# Patient Record
Sex: FEMALE | Race: WHITE | NOT HISPANIC OR LATINO
[De-identification: names, ages, dates, MRNs, and addresses within clinical notes are randomized per-mention and may not be internally consistent; named-entity substitution may affect disease eponyms.]

---

## 2018-03-06 ENCOUNTER — TRANSCRIPTION ENCOUNTER (OUTPATIENT)
Age: 58
End: 2018-03-06

## 2018-04-16 ENCOUNTER — TRANSCRIPTION ENCOUNTER (OUTPATIENT)
Age: 58
End: 2018-04-16

## 2019-03-25 ENCOUNTER — TRANSCRIPTION ENCOUNTER (OUTPATIENT)
Age: 59
End: 2019-03-25

## 2019-04-16 ENCOUNTER — TRANSCRIPTION ENCOUNTER (OUTPATIENT)
Age: 59
End: 2019-04-16

## 2019-07-08 ENCOUNTER — TRANSCRIPTION ENCOUNTER (OUTPATIENT)
Age: 59
End: 2019-07-08

## 2019-09-16 ENCOUNTER — APPOINTMENT (OUTPATIENT)
Dept: UROLOGY | Facility: CLINIC | Age: 59
End: 2019-09-16
Payer: COMMERCIAL

## 2019-09-16 VITALS
HEIGHT: 62 IN | TEMPERATURE: 98 F | HEART RATE: 89 BPM | DIASTOLIC BLOOD PRESSURE: 87 MMHG | RESPIRATION RATE: 16 BRPM | SYSTOLIC BLOOD PRESSURE: 131 MMHG | BODY MASS INDEX: 23.55 KG/M2 | WEIGHT: 128 LBS

## 2019-09-16 DIAGNOSIS — N39.46 MIXED INCONTINENCE: ICD-10-CM

## 2019-09-16 DIAGNOSIS — Z78.9 OTHER SPECIFIED HEALTH STATUS: ICD-10-CM

## 2019-09-16 DIAGNOSIS — Z80.8 FAMILY HISTORY OF MALIGNANT NEOPLASM OF OTHER ORGANS OR SYSTEMS: ICD-10-CM

## 2019-09-16 DIAGNOSIS — Z83.3 FAMILY HISTORY OF DIABETES MELLITUS: ICD-10-CM

## 2019-09-16 DIAGNOSIS — Z82.49 FAMILY HISTORY OF ISCHEMIC HEART DISEASE AND OTHER DISEASES OF THE CIRCULATORY SYSTEM: ICD-10-CM

## 2019-09-16 DIAGNOSIS — N32.81 OVERACTIVE BLADDER: ICD-10-CM

## 2019-09-16 PROCEDURE — 99204 OFFICE O/P NEW MOD 45 MIN: CPT

## 2019-09-16 NOTE — PHYSICAL EXAM
[General Appearance - Well Developed] : well developed [General Appearance - Well Nourished] : well nourished [Normal Appearance] : normal appearance [General Appearance - In No Acute Distress] : no acute distress [Well Groomed] : well groomed [Edema] : no peripheral edema [Exaggerated Use Of Accessory Muscles For Inspiration] : no accessory muscle use [Respiration, Rhythm And Depth] : normal respiratory rhythm and effort [Abdomen Soft] : soft [Abdomen Tenderness] : non-tender [Costovertebral Angle Tenderness] : no ~M costovertebral angle tenderness [Urethral Meatus] : the meatus of the urethra showed no abnormalities [External Female Genitalia] : normal external genitalia [Urinary Bladder Findings] : the bladder was normal on palpation [Vagina] : normal vaginal exam [FreeTextEntry1] : - UH, - CST, no prolapse noted.  [Normal Station and Gait] : the gait and station were normal for the patient's age [] : no rash [No Focal Deficits] : no focal deficits [Oriented To Time, Place, And Person] : oriented to person, place, and time

## 2019-09-16 NOTE — REVIEW OF SYSTEMS
[Negative] : Heme/Lymph [Eyesight Problems] : eyesight problems [Dry Eyes] : dryness of the eyes [Chest Pain] : chest pain [Palpitations] : palpitations [Abdominal Pain] : abdominal pain [Shortness Of Breath] : shortness of breath [Constipation] : constipation [Vomiting] : vomiting [Diarrhea] : diarrhea [Heartburn] : heartburn [Painful Echelon] : painful Echelon [Loss of interest] : loss of interest in sexual activity [Genital bacterial infection] : genital bacterial infection [Genital yeast infection] : genital yeast infection [Presently in menopause ___] : presently in menopause [unfilled] [Seen by urologist before (Name)  ___] : Previously seen by a urologist: [unfilled] [Urine Infection (bladder/kidney)] : bladder/kidney infection [Pain during urination] : pain during urination [Wake up at night to urinate  How many times?  ___] : wakes up to urinate [unfilled] times during the night [Strong urge to urinate] : strong urge to urinate [Strain or push to urinate] : strain or push to urinate [Wait a long time to urinate] : waits a long time to urinate [Slow urine stream] : slow urine stream [Interrupted urine stream] : interrupted urine stream [Bladder fullness after urinating] : bladder fullness after urinating [Leakage of urine with urgency] : leakage of urine with urgency [Leakage of urine with straining, coughing, laughing] : leakage of urine with straining, coughing, laughing [Joint Swelling] : joint swelling [Joint Pain] : joint pain [Limb Swelling] : limb swelling [Joint Stiffness] : joint stiffness [Skin Lesions] : skin lesion [Skin Wound] : skin wound [Dizziness] : dizziness [Itching] : itching [Limb Weakness] : limb weakness [Anxiety] : anxiety [Depression] : depression [Hot Flashes] : hot flashes [Muscle Weakness] : muscle weakness [Feelings Of Weakness] : feelings of weakness [FreeTextEntry3] : frequent urination

## 2019-09-16 NOTE — HISTORY OF PRESENT ILLNESS
[FreeTextEntry1] : 59F  presenting with new onset urgency. Patient had a midurethral sling in  for stress urinary incontinence which she says has been effective in managing her symptoms. Patient states over the past few months she has been experiencing urgency, and that when she feels the urge to void she has to run to the restroom. She has been wearing a pantiliner during the day as sometimes she leaks on the way to the restroom. One night, she was unable to make it and voided on herself. Upon completion of voiding, she often finds herself voiding more as she stands to leave the toilet although she does not have the sensation that she must continue urinating. She also state she has fecal urgency, and must run to the restroom to defecate when feeling the urge, and sometimes a small amount of stool leaks out. She denies irritative voiding symptoms. . Occ DEEPALI. \par \par Patient with a history of migraines receives regular Botox injections.

## 2019-09-16 NOTE — ASSESSMENT
[FreeTextEntry1] : \par \par Impression/plan: 60 yo woman with PSH MUS with OAB, ORQUIDEA and incomplete emptying. \par \par 1. VUDS.\par 2. Cysto. \par 3. VD (SP). \par

## 2019-12-14 ENCOUNTER — EMERGENCY (EMERGENCY)
Facility: HOSPITAL | Age: 59
LOS: 1 days | Discharge: HOME | End: 2019-12-14
Attending: EMERGENCY MEDICINE
Payer: COMMERCIAL

## 2019-12-14 VITALS
RESPIRATION RATE: 18 BRPM | SYSTOLIC BLOOD PRESSURE: 124 MMHG | OXYGEN SATURATION: 99 % | DIASTOLIC BLOOD PRESSURE: 78 MMHG | HEART RATE: 60 BPM | TEMPERATURE: 98 F

## 2019-12-14 VITALS
OXYGEN SATURATION: 98 % | HEIGHT: 62 IN | WEIGHT: 134.92 LBS | HEART RATE: 84 BPM | SYSTOLIC BLOOD PRESSURE: 132 MMHG | TEMPERATURE: 98 F | RESPIRATION RATE: 20 BRPM | DIASTOLIC BLOOD PRESSURE: 87 MMHG

## 2019-12-14 LAB
ALBUMIN SERPL ELPH-MCNC: 4.7 G/DL — SIGNIFICANT CHANGE UP (ref 3.3–5)
ALP SERPL-CCNC: 65 U/L — SIGNIFICANT CHANGE UP (ref 40–120)
ALT FLD-CCNC: 16 U/L — SIGNIFICANT CHANGE UP (ref 10–45)
ANION GAP SERPL CALC-SCNC: 13 MMOL/L — SIGNIFICANT CHANGE UP (ref 5–17)
APPEARANCE UR: CLEAR — SIGNIFICANT CHANGE UP
AST SERPL-CCNC: 19 U/L — SIGNIFICANT CHANGE UP (ref 10–40)
BACTERIA # UR AUTO: NEGATIVE — SIGNIFICANT CHANGE UP
BASOPHILS # BLD AUTO: 0.04 K/UL — SIGNIFICANT CHANGE UP (ref 0–0.2)
BASOPHILS NFR BLD AUTO: 1 % — SIGNIFICANT CHANGE UP (ref 0–2)
BILIRUB SERPL-MCNC: 0.2 MG/DL — SIGNIFICANT CHANGE UP (ref 0.2–1.2)
BILIRUB UR-MCNC: NEGATIVE — SIGNIFICANT CHANGE UP
BUN SERPL-MCNC: 21 MG/DL — SIGNIFICANT CHANGE UP (ref 7–23)
CALCIUM SERPL-MCNC: 9.3 MG/DL — SIGNIFICANT CHANGE UP (ref 8.4–10.5)
CHLORIDE SERPL-SCNC: 102 MMOL/L — SIGNIFICANT CHANGE UP (ref 96–108)
CO2 SERPL-SCNC: 24 MMOL/L — SIGNIFICANT CHANGE UP (ref 22–31)
COLOR SPEC: SIGNIFICANT CHANGE UP
CREAT SERPL-MCNC: 0.78 MG/DL — SIGNIFICANT CHANGE UP (ref 0.5–1.3)
DIFF PNL FLD: NEGATIVE — SIGNIFICANT CHANGE UP
EOSINOPHIL # BLD AUTO: 0.08 K/UL — SIGNIFICANT CHANGE UP (ref 0–0.5)
EOSINOPHIL NFR BLD AUTO: 2.1 % — SIGNIFICANT CHANGE UP (ref 0–6)
EPI CELLS # UR: 2 /HPF — SIGNIFICANT CHANGE UP
GLUCOSE SERPL-MCNC: 96 MG/DL — SIGNIFICANT CHANGE UP (ref 70–99)
GLUCOSE UR QL: NEGATIVE — SIGNIFICANT CHANGE UP
HCT VFR BLD CALC: 39.2 % — SIGNIFICANT CHANGE UP (ref 34.5–45)
HGB BLD-MCNC: 13.3 G/DL — SIGNIFICANT CHANGE UP (ref 11.5–15.5)
HYALINE CASTS # UR AUTO: 0 /LPF — SIGNIFICANT CHANGE UP (ref 0–2)
IMM GRANULOCYTES NFR BLD AUTO: 0.3 % — SIGNIFICANT CHANGE UP (ref 0–1.5)
KETONES UR-MCNC: NEGATIVE — SIGNIFICANT CHANGE UP
LEUKOCYTE ESTERASE UR-ACNC: ABNORMAL
LYMPHOCYTES # BLD AUTO: 1.52 K/UL — SIGNIFICANT CHANGE UP (ref 1–3.3)
LYMPHOCYTES # BLD AUTO: 39.3 % — SIGNIFICANT CHANGE UP (ref 13–44)
MCHC RBC-ENTMCNC: 30.5 PG — SIGNIFICANT CHANGE UP (ref 27–34)
MCHC RBC-ENTMCNC: 33.9 GM/DL — SIGNIFICANT CHANGE UP (ref 32–36)
MCV RBC AUTO: 89.9 FL — SIGNIFICANT CHANGE UP (ref 80–100)
MONOCYTES # BLD AUTO: 0.33 K/UL — SIGNIFICANT CHANGE UP (ref 0–0.9)
MONOCYTES NFR BLD AUTO: 8.5 % — SIGNIFICANT CHANGE UP (ref 2–14)
NEUTROPHILS # BLD AUTO: 1.89 K/UL — SIGNIFICANT CHANGE UP (ref 1.8–7.4)
NEUTROPHILS NFR BLD AUTO: 48.8 % — SIGNIFICANT CHANGE UP (ref 43–77)
NITRITE UR-MCNC: NEGATIVE — SIGNIFICANT CHANGE UP
NRBC # BLD: 0 /100 WBCS — SIGNIFICANT CHANGE UP (ref 0–0)
PH UR: 6 — SIGNIFICANT CHANGE UP (ref 5–8)
PLATELET # BLD AUTO: 267 K/UL — SIGNIFICANT CHANGE UP (ref 150–400)
POTASSIUM SERPL-MCNC: 4 MMOL/L — SIGNIFICANT CHANGE UP (ref 3.5–5.3)
POTASSIUM SERPL-SCNC: 4 MMOL/L — SIGNIFICANT CHANGE UP (ref 3.5–5.3)
PROT SERPL-MCNC: 7.4 G/DL — SIGNIFICANT CHANGE UP (ref 6–8.3)
PROT UR-MCNC: NEGATIVE — SIGNIFICANT CHANGE UP
RBC # BLD: 4.36 M/UL — SIGNIFICANT CHANGE UP (ref 3.8–5.2)
RBC # FLD: 12 % — SIGNIFICANT CHANGE UP (ref 10.3–14.5)
RBC CASTS # UR COMP ASSIST: 1 /HPF — SIGNIFICANT CHANGE UP (ref 0–4)
SODIUM SERPL-SCNC: 139 MMOL/L — SIGNIFICANT CHANGE UP (ref 135–145)
SP GR SPEC: 1.01 — SIGNIFICANT CHANGE UP (ref 1.01–1.02)
UROBILINOGEN FLD QL: NEGATIVE — SIGNIFICANT CHANGE UP
WBC # BLD: 3.87 K/UL — SIGNIFICANT CHANGE UP (ref 3.8–10.5)
WBC # FLD AUTO: 3.87 K/UL — SIGNIFICANT CHANGE UP (ref 3.8–10.5)
WBC UR QL: 47 /HPF — HIGH (ref 0–5)

## 2019-12-14 PROCEDURE — 99284 EMERGENCY DEPT VISIT MOD MDM: CPT | Mod: 25

## 2019-12-14 PROCEDURE — 83690 ASSAY OF LIPASE: CPT

## 2019-12-14 PROCEDURE — 85027 COMPLETE CBC AUTOMATED: CPT

## 2019-12-14 PROCEDURE — 99285 EMERGENCY DEPT VISIT HI MDM: CPT

## 2019-12-14 PROCEDURE — 87086 URINE CULTURE/COLONY COUNT: CPT

## 2019-12-14 PROCEDURE — 96374 THER/PROPH/DIAG INJ IV PUSH: CPT

## 2019-12-14 PROCEDURE — 81001 URINALYSIS AUTO W/SCOPE: CPT

## 2019-12-14 PROCEDURE — 74176 CT ABD & PELVIS W/O CONTRAST: CPT

## 2019-12-14 PROCEDURE — 80053 COMPREHEN METABOLIC PANEL: CPT

## 2019-12-14 PROCEDURE — 74176 CT ABD & PELVIS W/O CONTRAST: CPT | Mod: 26

## 2019-12-14 RX ORDER — CEPHALEXIN 500 MG
500 CAPSULE ORAL ONCE
Refills: 0 | Status: COMPLETED | OUTPATIENT
Start: 2019-12-14 | End: 2019-12-14

## 2019-12-14 RX ORDER — MORPHINE SULFATE 50 MG/1
4 CAPSULE, EXTENDED RELEASE ORAL ONCE
Refills: 0 | Status: DISCONTINUED | OUTPATIENT
Start: 2019-12-14 | End: 2019-12-14

## 2019-12-14 RX ORDER — CEPHALEXIN 500 MG
1 CAPSULE ORAL
Qty: 20 | Refills: 0
Start: 2019-12-14 | End: 2019-12-23

## 2019-12-14 RX ORDER — SODIUM CHLORIDE 9 MG/ML
2000 INJECTION, SOLUTION INTRAVENOUS ONCE
Refills: 0 | Status: COMPLETED | OUTPATIENT
Start: 2019-12-14 | End: 2019-12-14

## 2019-12-14 RX ORDER — CYCLOBENZAPRINE HYDROCHLORIDE 10 MG/1
10 TABLET, FILM COATED ORAL ONCE
Refills: 0 | Status: COMPLETED | OUTPATIENT
Start: 2019-12-14 | End: 2019-12-14

## 2019-12-14 RX ADMIN — Medication 500 MILLIGRAM(S): at 06:49

## 2019-12-14 RX ADMIN — CYCLOBENZAPRINE HYDROCHLORIDE 10 MILLIGRAM(S): 10 TABLET, FILM COATED ORAL at 06:30

## 2019-12-14 RX ADMIN — SODIUM CHLORIDE 1200 MILLILITER(S): 9 INJECTION, SOLUTION INTRAVENOUS at 04:59

## 2019-12-14 RX ADMIN — MORPHINE SULFATE 4 MILLIGRAM(S): 50 CAPSULE, EXTENDED RELEASE ORAL at 04:59

## 2019-12-14 RX ADMIN — MORPHINE SULFATE 4 MILLIGRAM(S): 50 CAPSULE, EXTENDED RELEASE ORAL at 06:31

## 2019-12-14 NOTE — ED PROVIDER NOTE - ATTENDING CONTRIBUTION TO CARE
L flank/LLQ pain with some tenderness, intermittent n/v. no fevers, chills. bening abd exam, significant L lower back tenderness. will scan to r/o kidney stone, divertic, uti, AAA, otherwise will tx as MSK pain. L flank/LLQ pain with some tenderness, intermittent n/v. no fevers, chills. bening abd exam, significant L lower back tenderness. will scan to r/o kidney stone, divertic, uti, AAA.    W/u neg for acute process, UA positive. will tx as pyelo given persistent pain. pt stable for outpt therapy. given return precautions.

## 2019-12-14 NOTE — ED PROVIDER NOTE - PHYSICAL EXAMINATION
GEN: Well appearing, well nourished, in no apparent distress.  HEAD: NCAT  HEENT: PERRL, Airway patent, EOMI, non-erythematous pharynx, no exudates, uvula midline, MMM, neck supple, no LAD, no JVD  LUNG: CTAB, no adventitious sounds, no retractions, no nasal flaring  CV: RRR, no murmurs,   Abd: soft, TTP LLQ, ND, no rebound or guarding, BS+ in all quadrants, + L CVAT and L lower lumbar TTP  MSK: WWP, Pulses 2+ in extremities, No edema   Neuro:  AAOx3, Ambulatory with stable gait. neg straight leg test, sensations intact, strength 5/5  Skin: Warm and dry, no evidence of rash  Psych: normal mood and affect

## 2019-12-14 NOTE — ED ADULT NURSE NOTE - CAS DISCH CONDITION
Stable Pt states they are not in much pain 2/10 and are okay to go home. Pt educated on Tylenol, Keflex and Motrin use./Improved

## 2019-12-14 NOTE — ED PROVIDER NOTE - PATIENT PORTAL LINK FT
You can access the FollowMyHealth Patient Portal offered by [facility] by registering at the following website: [email or web address]. By joining [facility]’s FollowMyHealth portal, you will also be able to view your health information using other applications (apps) compatible with our system.

## 2019-12-14 NOTE — ED PROVIDER NOTE - OBJECTIVE STATEMENT
59F hx htn, s/p sling for urinary incontinence presents with L flank pain radiating to L groin x 2 weeks that woke her up this AM as it intensified. Worsened by bumps on road in car. +baseline urinary urgency. Patient denies trauma to area, chest pain, SOB, f/c, cough, N/V/D/C, weakness, HA, dizziness, extremity pain or swelling or other complaints.

## 2019-12-14 NOTE — ED PROVIDER NOTE - NSFOLLOWUPINSTRUCTIONS_ED_ALL_ED_FT
Routine Home Care as follows:  - Please continue to take your antibiotic as prescribed.        -  Keflex 500 mg every 12 hours, for a total of 10 more days  - Make sure you drinks plenty of fluid.   - Follow up with your PMD within 48-72hours. .  - Please take 400 mg of Ibuprofen (aka Motrin, Advil) and/or 500 mg Acetaminophen (aka Tylenol) every 6 hours, as needed, for mild-moderate pain.  Please do not take these medications if you do no have pain or if you have any history of bleeding disorders, kidney or liver disease. Do not use ibuprofen if you are on blood thinners (anti-coagulation). Don't use more than 3500mg of Tylenol in any 24-hour period. Make sure your other prescription/over-the-counter medications don't contain any Tylenol so you don't take too much  - Return to ED for any new or worsening symptoms     Urinary Tract Infection    A urinary tract infection (UTI) is an infection of any part of the urinary tract, which includes the kidneys, ureters, bladder, and urethra. Risk factors include ignoring your need to urinate, wiping back to front if female, being an uncircumcised male, and having diabetes or a weak immune system. Symptoms include frequent urination, pain or burning with urination, foul smelling urine, cloudy urine, pain in the lower abdomen, blood in the urine, and fever. If you were prescribed an antibiotic medicine, take it as told by your health care provider. Do not stop taking the antibiotic even if you start to feel better.    SEEK IMMEDIATE MEDICAL CARE IF YOU HAVE ANY OF THE FOLLOWING SYMPTOMS: severe back or abdominal pain, fever, inability to keep fluids or medicine down, dizziness/lightheadedness, or a change in mental status.

## 2019-12-14 NOTE — ED PROVIDER NOTE - CLINICAL SUMMARY MEDICAL DECISION MAKING FREE TEXT BOX
V59F hx htn, s/p sling for urinary incontinence presents with L flank pain radiating to L groin x 2 weeks that woke her up this AM as it intensified. Worsened by bumps on road in car. +baseline urinary urgency. concern for kidney stone or cystitis msk pain, lower likelihood of diverticulitis. CT abd, lipase, basic labs, UA and reassess

## 2019-12-14 NOTE — ED ADULT NURSE NOTE - OBJECTIVE STATEMENT
Pt is a 59 y Female c/o lower L back pain for the past few days. Pt states pain has been on and off, but woke her out of her sleep this morning. PMH Migraines, PUD, and Female incontinence with mesh implant. Pt is tender in the LLQ. Pt is A&Ox4 pt has  at bedside and was educated about call bell. IV started. Pt denies seeing hematuria, having SOB, chest pain, nausea, vomiting, dizziness.

## 2019-12-14 NOTE — ED PROVIDER NOTE - PSH
(normal spontaneous vaginal delivery)  , ,   S/P laparoscopy   for ectopic pregnancy  S/P tonsillectomy  ate 20  S/P tooth extraction  age 23- 4 wisdom teeth

## 2019-12-15 LAB
CULTURE RESULTS: SIGNIFICANT CHANGE UP
SPECIMEN SOURCE: SIGNIFICANT CHANGE UP

## 2020-12-04 ENCOUNTER — TRANSCRIPTION ENCOUNTER (OUTPATIENT)
Age: 60
End: 2020-12-04

## 2020-12-06 ENCOUNTER — TRANSCRIPTION ENCOUNTER (OUTPATIENT)
Age: 60
End: 2020-12-06

## 2020-12-28 ENCOUNTER — TRANSCRIPTION ENCOUNTER (OUTPATIENT)
Age: 60
End: 2020-12-28

## 2020-12-28 ENCOUNTER — EMERGENCY (EMERGENCY)
Facility: HOSPITAL | Age: 60
LOS: 1 days | Discharge: HOME | End: 2020-12-28
Attending: STUDENT IN AN ORGANIZED HEALTH CARE EDUCATION/TRAINING PROGRAM
Payer: COMMERCIAL

## 2020-12-28 VITALS
TEMPERATURE: 98 F | RESPIRATION RATE: 17 BRPM | HEART RATE: 107 BPM | DIASTOLIC BLOOD PRESSURE: 93 MMHG | WEIGHT: 132.06 LBS | OXYGEN SATURATION: 100 % | SYSTOLIC BLOOD PRESSURE: 126 MMHG | HEIGHT: 62 IN

## 2020-12-28 DIAGNOSIS — J02.9 ACUTE PHARYNGITIS, UNSPECIFIED: ICD-10-CM

## 2020-12-28 LAB
ALBUMIN SERPL ELPH-MCNC: 4.7 G/DL — SIGNIFICANT CHANGE UP (ref 3.3–5)
ALP SERPL-CCNC: 80 U/L — SIGNIFICANT CHANGE UP (ref 40–120)
ALT FLD-CCNC: 13 U/L — SIGNIFICANT CHANGE UP (ref 10–45)
ANION GAP SERPL CALC-SCNC: 13 MMOL/L — SIGNIFICANT CHANGE UP (ref 5–17)
APTT BLD: 30.8 SEC — SIGNIFICANT CHANGE UP (ref 27.5–35.5)
AST SERPL-CCNC: 18 U/L — SIGNIFICANT CHANGE UP (ref 10–40)
BASE EXCESS BLDV CALC-SCNC: 3.9 MMOL/L — HIGH (ref -2–2)
BASOPHILS # BLD AUTO: 0.05 K/UL — SIGNIFICANT CHANGE UP (ref 0–0.2)
BASOPHILS NFR BLD AUTO: 1 % — SIGNIFICANT CHANGE UP (ref 0–2)
BILIRUB SERPL-MCNC: 0.2 MG/DL — SIGNIFICANT CHANGE UP (ref 0.2–1.2)
BLD GP AB SCN SERPL QL: NEGATIVE — SIGNIFICANT CHANGE UP
BUN SERPL-MCNC: 11 MG/DL — SIGNIFICANT CHANGE UP (ref 7–23)
CA-I SERPL-SCNC: 1.16 MMOL/L — SIGNIFICANT CHANGE UP (ref 1.12–1.3)
CALCIUM SERPL-MCNC: 9.6 MG/DL — SIGNIFICANT CHANGE UP (ref 8.4–10.5)
CHLORIDE BLDV-SCNC: 105 MMOL/L — SIGNIFICANT CHANGE UP (ref 96–108)
CHLORIDE SERPL-SCNC: 101 MMOL/L — SIGNIFICANT CHANGE UP (ref 96–108)
CO2 BLDV-SCNC: 30 MMOL/L — SIGNIFICANT CHANGE UP (ref 22–30)
CO2 SERPL-SCNC: 24 MMOL/L — SIGNIFICANT CHANGE UP (ref 22–31)
CREAT SERPL-MCNC: 0.79 MG/DL — SIGNIFICANT CHANGE UP (ref 0.5–1.3)
EOSINOPHIL # BLD AUTO: 0.09 K/UL — SIGNIFICANT CHANGE UP (ref 0–0.5)
EOSINOPHIL NFR BLD AUTO: 1.9 % — SIGNIFICANT CHANGE UP (ref 0–6)
GAS PNL BLDV: 134 MMOL/L — LOW (ref 135–145)
GAS PNL BLDV: SIGNIFICANT CHANGE UP
GAS PNL BLDV: SIGNIFICANT CHANGE UP
GLUCOSE BLDV-MCNC: 96 MG/DL — SIGNIFICANT CHANGE UP (ref 70–99)
GLUCOSE SERPL-MCNC: 104 MG/DL — HIGH (ref 70–99)
HCO3 BLDV-SCNC: 29 MMOL/L — SIGNIFICANT CHANGE UP (ref 21–29)
HCT VFR BLD CALC: 40.8 % — SIGNIFICANT CHANGE UP (ref 34.5–45)
HCT VFR BLDA CALC: 41 % — SIGNIFICANT CHANGE UP (ref 39–50)
HGB BLD CALC-MCNC: 13.3 G/DL — SIGNIFICANT CHANGE UP (ref 11.5–15.5)
HGB BLD-MCNC: 13.7 G/DL — SIGNIFICANT CHANGE UP (ref 11.5–15.5)
IMM GRANULOCYTES NFR BLD AUTO: 0.2 % — SIGNIFICANT CHANGE UP (ref 0–1.5)
INR BLD: 0.85 RATIO — LOW (ref 0.88–1.16)
LACTATE BLDV-MCNC: 1.3 MMOL/L — SIGNIFICANT CHANGE UP (ref 0.7–2)
LYMPHOCYTES # BLD AUTO: 1.33 K/UL — SIGNIFICANT CHANGE UP (ref 1–3.3)
LYMPHOCYTES # BLD AUTO: 27.5 % — SIGNIFICANT CHANGE UP (ref 13–44)
MCHC RBC-ENTMCNC: 30.2 PG — SIGNIFICANT CHANGE UP (ref 27–34)
MCHC RBC-ENTMCNC: 33.6 GM/DL — SIGNIFICANT CHANGE UP (ref 32–36)
MCV RBC AUTO: 90.1 FL — SIGNIFICANT CHANGE UP (ref 80–100)
MONOCYTES # BLD AUTO: 0.38 K/UL — SIGNIFICANT CHANGE UP (ref 0–0.9)
MONOCYTES NFR BLD AUTO: 7.9 % — SIGNIFICANT CHANGE UP (ref 2–14)
NEUTROPHILS # BLD AUTO: 2.97 K/UL — SIGNIFICANT CHANGE UP (ref 1.8–7.4)
NEUTROPHILS NFR BLD AUTO: 61.5 % — SIGNIFICANT CHANGE UP (ref 43–77)
NRBC # BLD: 0 /100 WBCS — SIGNIFICANT CHANGE UP (ref 0–0)
PCO2 BLDV: 47 MMHG — SIGNIFICANT CHANGE UP (ref 35–50)
PH BLDV: 7.4 — SIGNIFICANT CHANGE UP (ref 7.35–7.45)
PLATELET # BLD AUTO: 257 K/UL — SIGNIFICANT CHANGE UP (ref 150–400)
PO2 BLDV: 39 MMHG — SIGNIFICANT CHANGE UP (ref 25–45)
POTASSIUM BLDV-SCNC: 3.8 MMOL/L — SIGNIFICANT CHANGE UP (ref 3.5–5.3)
POTASSIUM SERPL-MCNC: 3.5 MMOL/L — SIGNIFICANT CHANGE UP (ref 3.5–5.3)
POTASSIUM SERPL-SCNC: 3.5 MMOL/L — SIGNIFICANT CHANGE UP (ref 3.5–5.3)
PROT SERPL-MCNC: 7.5 G/DL — SIGNIFICANT CHANGE UP (ref 6–8.3)
PROTHROM AB SERPL-ACNC: 10.3 SEC — LOW (ref 10.6–13.6)
RBC # BLD: 4.53 M/UL — SIGNIFICANT CHANGE UP (ref 3.8–5.2)
RBC # FLD: 12.6 % — SIGNIFICANT CHANGE UP (ref 10.3–14.5)
RH IG SCN BLD-IMP: NEGATIVE — SIGNIFICANT CHANGE UP
SAO2 % BLDV: 75 % — SIGNIFICANT CHANGE UP (ref 67–88)
SARS-COV-2 RNA SPEC QL NAA+PROBE: SIGNIFICANT CHANGE UP
SODIUM SERPL-SCNC: 138 MMOL/L — SIGNIFICANT CHANGE UP (ref 135–145)
TSH SERPL-MCNC: 3.24 UIU/ML — SIGNIFICANT CHANGE UP (ref 0.27–4.2)
WBC # BLD: 4.83 K/UL — SIGNIFICANT CHANGE UP (ref 3.8–10.5)
WBC # FLD AUTO: 4.83 K/UL — SIGNIFICANT CHANGE UP (ref 3.8–10.5)

## 2020-12-28 PROCEDURE — 99220: CPT

## 2020-12-28 PROCEDURE — 31575 DIAGNOSTIC LARYNGOSCOPY: CPT

## 2020-12-28 PROCEDURE — 71045 X-RAY EXAM CHEST 1 VIEW: CPT | Mod: 26

## 2020-12-28 PROCEDURE — 70491 CT SOFT TISSUE NECK W/DYE: CPT | Mod: 26

## 2020-12-28 PROCEDURE — 99284 EMERGENCY DEPT VISIT MOD MDM: CPT | Mod: 25

## 2020-12-28 RX ORDER — AMPICILLIN SODIUM AND SULBACTAM SODIUM 250; 125 MG/ML; MG/ML
3 INJECTION, POWDER, FOR SUSPENSION INTRAMUSCULAR; INTRAVENOUS EVERY 6 HOURS
Refills: 0 | Status: DISCONTINUED | OUTPATIENT
Start: 2020-12-28 | End: 2020-12-31

## 2020-12-28 RX ORDER — CEFTRIAXONE 500 MG/1
1000 INJECTION, POWDER, FOR SOLUTION INTRAMUSCULAR; INTRAVENOUS EVERY 24 HOURS
Refills: 0 | Status: DISCONTINUED | OUTPATIENT
Start: 2020-12-28 | End: 2020-12-28

## 2020-12-28 RX ORDER — KETOROLAC TROMETHAMINE 30 MG/ML
15 SYRINGE (ML) INJECTION ONCE
Refills: 0 | Status: DISCONTINUED | OUTPATIENT
Start: 2020-12-28 | End: 2020-12-28

## 2020-12-28 RX ORDER — SODIUM CHLORIDE 9 MG/ML
1000 INJECTION INTRAMUSCULAR; INTRAVENOUS; SUBCUTANEOUS
Refills: 0 | Status: DISCONTINUED | OUTPATIENT
Start: 2020-12-28 | End: 2020-12-31

## 2020-12-28 RX ORDER — CEFTRIAXONE 500 MG/1
1000 INJECTION, POWDER, FOR SOLUTION INTRAMUSCULAR; INTRAVENOUS ONCE
Refills: 0 | Status: COMPLETED | OUTPATIENT
Start: 2020-12-28 | End: 2020-12-28

## 2020-12-28 RX ORDER — FAMOTIDINE 10 MG/ML
20 INJECTION INTRAVENOUS ONCE
Refills: 0 | Status: COMPLETED | OUTPATIENT
Start: 2020-12-28 | End: 2020-12-28

## 2020-12-28 RX ORDER — ACETAMINOPHEN 500 MG
1000 TABLET ORAL ONCE
Refills: 0 | Status: COMPLETED | OUTPATIENT
Start: 2020-12-28 | End: 2020-12-28

## 2020-12-28 RX ORDER — DEXAMETHASONE 0.5 MG/5ML
6 ELIXIR ORAL EVERY 8 HOURS
Refills: 0 | Status: COMPLETED | OUTPATIENT
Start: 2020-12-28 | End: 2020-12-29

## 2020-12-28 RX ORDER — ACETAMINOPHEN 500 MG
975 TABLET ORAL EVERY 6 HOURS
Refills: 0 | Status: DISCONTINUED | OUTPATIENT
Start: 2020-12-28 | End: 2020-12-31

## 2020-12-28 RX ORDER — DEXTROAMPHETAMINE SACCHARATE, AMPHETAMINE ASPARTATE, DEXTROAMPHETAMINE SULFATE AND AMPHETAMINE SULFATE 1.875; 1.875; 1.875; 1.875 MG/1; MG/1; MG/1; MG/1
30 TABLET ORAL
Refills: 0 | Status: COMPLETED | OUTPATIENT
Start: 2020-12-28 | End: 2021-01-04

## 2020-12-28 RX ORDER — DEXAMETHASONE 0.5 MG/5ML
6 ELIXIR ORAL ONCE
Refills: 0 | Status: COMPLETED | OUTPATIENT
Start: 2020-12-28 | End: 2020-12-28

## 2020-12-28 RX ADMIN — SODIUM CHLORIDE 125 MILLILITER(S): 9 INJECTION INTRAMUSCULAR; INTRAVENOUS; SUBCUTANEOUS at 18:23

## 2020-12-28 RX ADMIN — Medication 6 MILLIGRAM(S): at 14:54

## 2020-12-28 RX ADMIN — Medication 15 MILLIGRAM(S): at 19:30

## 2020-12-28 RX ADMIN — Medication 1 TABLET(S): at 20:58

## 2020-12-28 RX ADMIN — AMPICILLIN SODIUM AND SULBACTAM SODIUM 200 GRAM(S): 250; 125 INJECTION, POWDER, FOR SUSPENSION INTRAMUSCULAR; INTRAVENOUS at 23:54

## 2020-12-28 RX ADMIN — Medication 6 MILLIGRAM(S): at 23:40

## 2020-12-28 RX ADMIN — FAMOTIDINE 20 MILLIGRAM(S): 10 INJECTION INTRAVENOUS at 18:23

## 2020-12-28 RX ADMIN — Medication 15 MILLIGRAM(S): at 18:38

## 2020-12-28 RX ADMIN — CEFTRIAXONE 100 MILLIGRAM(S): 500 INJECTION, POWDER, FOR SOLUTION INTRAMUSCULAR; INTRAVENOUS at 11:11

## 2020-12-28 RX ADMIN — Medication 400 MILLIGRAM(S): at 11:10

## 2020-12-28 RX ADMIN — Medication 100 MILLIGRAM(S): at 12:37

## 2020-12-28 RX ADMIN — AMPICILLIN SODIUM AND SULBACTAM SODIUM 200 GRAM(S): 250; 125 INJECTION, POWDER, FOR SUSPENSION INTRAMUSCULAR; INTRAVENOUS at 18:33

## 2020-12-28 RX ADMIN — Medication 30 MILLILITER(S): at 18:23

## 2020-12-28 RX ADMIN — Medication 1 TABLET(S): at 20:17

## 2020-12-28 NOTE — ED CDU PROVIDER INITIAL DAY NOTE - PROGRESS NOTE DETAILS
Pt seen at bedside. Pt in NAD. VS stable from last reading. Pt reports mild improvement from time of last assessment, will continue to monitor. Pt tolerating PO. CDU PROGRESS NOTE KAREN OCAMPO: Received pt at 1900 sign-out. Case/plan reviewed. VSS. Pt c/o discomfort on swallowing and mild headache. uvula erythematous, mildly edematous, midline. No evidence of airway obstruction, no trismus, no drooling, no dysphonia, no submental fullness/tenderness, no purulence in the oral cavity. No stridor. Neck supple FROM, no focal neuro deficits. Will continue symptomatic treatment and antibiotics. CDU PROGRESS NOTE PA DAMARIS: Pt resting comfortably, NAD, No evidence of airway obstruction, no trismus, no drooling, no dysphonia, no submental fullness/tenderness, no purulence in the oral cavity. No stridor. Pt reports feeling some improvement after receiving analgesic. Pt declines further pain medication now. Will closely monitor.

## 2020-12-28 NOTE — ED CDU PROVIDER INITIAL DAY NOTE - PHYSICAL EXAMINATION
GEN: Pt in NAD, non-toxic.   PSYCH: Affect appropriate.  EYES: Sclera white w/o injection.   ENT: Head NCAT. MMM, uvula erythematous, mildly edematous, midline. No evidence of airway obstruction, no trismus, no drooling, no dysphonia, no submental fullness/tenderness, no purulence in the oral cavity. No stridor. Neck supple FROM.  RESP: Speaking in full sentences. No tripod positioning. CTA b/l, no wheezes, rales, or rhonchi.   CARDIAC: RRR, clear distinct S1, S2, no appreciable murmurs.  ABD: Abdomen soft, non-tender, no HSM. No CVAT b/l.  VASC: No edema or calf tenderness.  SKIN: No rashes on the trunk.

## 2020-12-28 NOTE — ED CDU PROVIDER DISPOSITION NOTE - ATTENDING CONTRIBUTION TO CARE
**CDU ATTENDING ADDENDUM (Dr. Artis Oconnor): I attest that I have directly examined this patient and reviewed and formulated the diagnostic and therapeutic management plan in collaboration with the advanced practitioner (NP, PA). Please see MDM note and remainder of EMR for findings from CC, HPI, ROS, and PE. (Jacob) **CDU ATTENDING ADDENDUM (Dr. Artis Oconnor): I attest that I have directly examined this patient and reviewed and formulated the diagnostic and therapeutic management plan in collaboration with the advanced practitioner (NP, PA). Please see MDM note and remainder of EMR for findings from CC, HPI, ROS, and PE. (Jacob/Joe)

## 2020-12-28 NOTE — ED ADULT NURSE NOTE - CHIEF COMPLAINT QUOTE
pt sent by [facility] for to r/o epiglotitis.  pt c/o throat edema with sob with difficulty swallowing.  no drooling or stridor noted at this time.

## 2020-12-28 NOTE — ED CDU PROVIDER DISPOSITION NOTE - CLINICAL COURSE
59 y/o F PMH HTN, migraine HA p/w acute onset of throat/neck pain and difficulty swallowing at 6am this morning. Was in normal state of health until this AM. States she feels like she'd be unable to swallow even liquids. No trouble breathing at this time. Has not had symptoms like this previously. Pt notes nasal congestion for the past week, denies other upper respiratory complaints. She denies any fevers/chills, HA, chest pain, SOB, palpitations, cough, wheezing, abd pain, n/v/d, urinary sxs, joint pain, or rashes. Notes she ate slimfast this morning, otherwise has not eaten anything else out of the ordinary for her. UTD on vaccines.  ED Course: labs including TSH non-actionable. COVID negative. CXR without acute pathology. CT neck shows evidence of early soft palate abscess, uvula swelling, and reactive LNs. Pt evaluated by ENT, recommend CDU for IV abx, steroids, pain control, frequent eval. 61 y/o F PMH HTN, migraine HA p/w acute onset of throat/neck pain and difficulty swallowing at 6am this morning. Was in normal state of health until this AM. States she feels like she'd be unable to swallow even liquids. No trouble breathing at this time. Has not had symptoms like this previously. Pt notes nasal congestion for the past week, denies other upper respiratory complaints. She denies any fevers/chills, HA, chest pain, SOB, palpitations, cough, wheezing, abd pain, n/v/d, urinary sxs, joint pain, or rashes. Notes she ate slimfast this morning, otherwise has not eaten anything else out of the ordinary for her. UTD on vaccines.  ED Course: labs including TSH non-actionable. COVID negative. CXR without acute pathology. CT neck shows evidence of early soft palate abscess, uvula swelling, and reactive LNs. Pt evaluated by ENT, recommend CDU for IV abx, steroids, pain control, frequent eval.  pt did well in cdu, pain improved. able to swallow soft solids. seen by ENT attending - ok to f/up outpt.

## 2020-12-28 NOTE — ED CDU PROVIDER DISPOSITION NOTE - NSFOLLOWUPINSTRUCTIONS_ED_ALL_ED_FT
1) Follow-up with your Primary Medical Doctor and ENT. Call today / next business day for prompt follow-up.  2) Take antibiotics as prescribed for full course of treatment. See attached instruction sheets for additional information, including information regarding signs and symptoms to look out for, reasons to seek immediate care and other important instructions.   3) Return to Emergency room for any worsening or persistent pain, weakness, fever, or any other concerning symptoms. 1. Stay hydrated. warm salt water gargles.   2. Continue current home medications  3. Take Augmentin 1 tab 2x/day for 9 days.  Start probiotic as instructed. Take Tessalon-perles 1 capsule every 8hrs as needed for cough.   4. Follow up with your PCP in 1-2 days. Follow up with ENT Dr. Ang #[phone] in 1-2 weeks. (Bring Printed results to your doctor visit)  5. Return if symptoms worsen, fever, weakness, spreading redness and all other concerns

## 2020-12-28 NOTE — ED PROVIDER NOTE - OBJECTIVE STATEMENT
59 y/o F PMH HTN, migraine HA p/w acute onset of throat/neck pain and difficulty swallowing at 6am this morning. Was in normal state of health until this AM. States she feels like she'd be unable to swallow even liquids. No trouble breathing at this time. Has not had symptoms like this previously. She denies any fevers/chills, HA, chest pain, SOB, palpitations, cough, wheezing, abd pain, n/v/d, urinary sxs, joint pain, or rashes. Notes she ate slimfast this morning, otherwise has not eaten anything else out of the ordinary for her.

## 2020-12-28 NOTE — ED PROVIDER NOTE - PHYSICAL EXAMINATION
PHYSICAL EXAM:  GENERAL: Sitting in stretcher appears uncomfortable due to pain  HENMT: Atraumatic, moist mucous membranes, uvula erythematous w/ mild exudate, no significant tonsillar swelling noted, uvula is midline  EYES: Clear bilaterally, PERRL, EOMs intact b/l  HEART: RRR, nl S1/S2, no murmurs noted  RESPIRATORY: Clear to auscultation bilaterally, no wheezes/rhonchi/rales  ABDOMEN: +BS, soft, nontender, nondistended  EXTREMITIES: No lower extremity edema, +2 radial pulses b/l  NEURO:  A&Ox4, no focal motor deficits or sensory deficits   Heme/LYMPH: No ecchymosis or bruising, no anterior/posterior cervical or supraclavicular LAD  SKIN:  Skin normal color for race, warm, dry and intact. No evidence of rash.

## 2020-12-28 NOTE — ED CDU PROVIDER INITIAL DAY NOTE - ATTENDING CONTRIBUTION TO CARE
I performed a history and physical exam of the patient and discussed their management with the PA. I reviewed the PA's note and agree with the documented findings and plan of care. My medical decision making and observations are found above.        59 y/o F PMH HTN, migraine HA p/w acute onset of throat/neck pain and difficulty swallowing at 6am this morning. During triage, patient complaining of difficulty breathing and rushed back for eval. ENT called for emergent scope to eval for airway closure. Noted to have mild lingual tonsil swelling but clear larynx/epiglottis. On exam, patient mildly dyspneic and anxious appearing but no stridor. +maintaining secretions, clear lungs. AAOX4. abd soft, ntnd. 2+ pulses in distal extremities b/l. CT neck to evaluate for rpa/pta/paratracheal abscess. Patient protecting airway - no indication for emergent intubation. Will start abx per ENT given lingual tonsilar swelling. CT consistant with soft pallate abscess. ENT recommending abx and no drainage at this time. CDU for obs/clinically improvement.

## 2020-12-28 NOTE — CONSULT NOTE ADULT - SUBJECTIVE AND OBJECTIVE BOX
CC: Throat pain    HPI: 61 y/o F PMH HTN, migraine HA p/w acute onset of throat/neck pain and difficulty swallowing at 6am this morning. Was in normal state of health until this AM. States she feels like she'd be unable to swallow even liquids. No trouble breathing at this time. Has not had symptoms like this previously. Says she feels like she has a lump in her throat and symptoms are temporarily relieved with sitting up. She denies any fevers/chills, HA, chest pain, SOB, palpitations, cough, wheezing, abd pain, n/v/d, urinary sxs, joint pain, or rashes.       PAST MEDICAL & SURGICAL HISTORY:  Female stress incontinence    Peptic ulcer    Migraines    Hypertension     (normal spontaneous vaginal delivery)  , ,     S/P laparoscopy   for ectopic pregnancy    S/P tooth extraction  age 23- 4 wisdom teeth    S/P tonsillectomy  at 22      Allergies    dust (Other; Sneezing)  quinolines (Rash)  smoke- hoarseness (Other)    Intolerances      MEDICATIONS  (STANDING):  clindamycin IVPB 600 milliGRAM(s) IV Intermittent once    MEDICATIONS  (PRN):      Social History: Never smoker    Family history: No significant medical history in first degree relatives      ROS:   ENT: all negative except as noted in HPI   Skin: No itching, dryness, rash, changes to hair, or skin masses  CV: denies palpitations  Pulm: denies SOB, cough, hemoptysis  GI: denies change in appetite, indigestion, n/v  : denies pertinent urinary symptoms, urgency  Neuro: denies numbness/tingling, loss of sensation  Psych: denies anxiety  MS: denies muscle weakness, instability  Heme: denies easy bruising or bleeding  Endo: denies heat/cold intolerance, excessive sweating  Vascular: denies LE edema    Vital Signs Last 24 Hrs  T(C): 36.4 (28 Dec 2020 09:51), Max: 36.4 (28 Dec 2020 09:51)  T(F): 97.5 (28 Dec 2020 09:51), Max: 97.5 (28 Dec 2020 09:51)  HR: 98 (28 Dec 2020 10:05) (98 - 107)  BP: 146/97 (28 Dec 2020 10:05) (126/93 - 146/97)  BP(mean): --  RR: 22 (28 Dec 2020 10:05) (17 - 22)  SpO2: 100% (28 Dec 2020 10:05) (100% - 100%)                          13.7   4.83  )-----------( 257      ( 28 Dec 2020 10:29 )             40.8        138  |  101  |  11  ----------------------------<  104<H>  3.5   |  24  |  0.79    Ca    9.6      28 Dec 2020 10:29    TPro  7.5  /  Alb  4.7  /  TBili  0.2  /  DBili  x   /  AST  18  /  ALT  13  /  AlkPhos  80         PHYSICAL EXAM:  Gen: NAD  Skin: No rashes, bruises, or lesions  Head: Normocephalic, Atraumatic  Face: no edema, erythema, or fluctuance. Parotid glands soft without mass  Eyes: no scleral injection  Nose: Nares bilaterally patent, no discharge  Mouth: No Stridor / Drooling / Trismus.  Mucosa moist, tongue/uvula midline, s/p tonsillectomy, mild uvular erythema  Neck: Flat, supple, no lymphadenopathy, trachea midline, no masses  Lymphatic: No lymphadenopathy  Resp: breathing easily, no stridor  CV: no peripheral edema/cyanosis  GI: nondistended   Peripheral vascular: no JVD or edema  Neuro: facial nerve intact, no facial droop      Fiberoptic Indirect laryngoscopy:  (Scope #2 used)  Reason for Laryngoscopy: Throat pain    Patient was unable to cooperate with mirror.  Nasopharynx, oropharynx, and hypopharynx clear, no bleeding. Tongue base, posterior pharyngeal wall, vallecula, epiglottis, and subglottis appear normal. Lingual tonsils are enlarged. Mild diffuse erythema with exudates, no edema, pooling of secretions, masses or lesions. Airway patent, no foreign body visualized. No glottic/supraglottic edema. Mild arytenoid edema and erythema. True vocal cords, vestibular folds, ventricles, pyriform sinuses, and aryepiglottic folds appear normal bilaterally. Vocal cords mobile with good contact b/l.     CC: Throat pain    HPI: 59 y/o F PMH HTN, migraine HA p/w acute onset of throat/neck pain and difficulty swallowing at 6am this morning. Was in normal state of health until this AM. States she feels like she'd be unable to swallow even liquids. No trouble breathing at this time. Has not had symptoms like this previously. Says she feels like she has a lump in her throat and symptoms are temporarily relieved with sitting up. She denies any fevers/chills, HA, chest pain, SOB, palpitations, cough, wheezing, abd pain, n/v/d, urinary sxs, joint pain, or rashes.       PAST MEDICAL & SURGICAL HISTORY:  Female stress incontinence    Peptic ulcer    Migraines    Hypertension     (normal spontaneous vaginal delivery)  , ,     S/P laparoscopy   for ectopic pregnancy    S/P tooth extraction  age 23- 4 wisdom teeth    S/P tonsillectomy  at 22      Allergies    dust (Other; Sneezing)  quinolines (Rash)  smoke- hoarseness (Other)    Intolerances      MEDICATIONS  (STANDING):  clindamycin IVPB 600 milliGRAM(s) IV Intermittent once    MEDICATIONS  (PRN):      Social History: Never smoker    Family history: No significant medical history in first degree relatives      ROS:   ENT: all negative except as noted in HPI   Skin: No itching, dryness, rash, changes to hair, or skin masses  CV: denies palpitations  Pulm: denies SOB, cough, hemoptysis  GI: denies change in appetite, indigestion, n/v  : denies pertinent urinary symptoms, urgency  Neuro: denies numbness/tingling, loss of sensation  Psych: denies anxiety  MS: denies muscle weakness, instability  Heme: denies easy bruising or bleeding  Endo: denies heat/cold intolerance, excessive sweating  Vascular: denies LE edema    Vital Signs Last 24 Hrs  T(C): 36.4 (28 Dec 2020 09:51), Max: 36.4 (28 Dec 2020 09:51)  T(F): 97.5 (28 Dec 2020 09:51), Max: 97.5 (28 Dec 2020 09:51)  HR: 98 (28 Dec 2020 10:05) (98 - 107)  BP: 146/97 (28 Dec 2020 10:05) (126/93 - 146/97)  BP(mean): --  RR: 22 (28 Dec 2020 10:05) (17 - 22)  SpO2: 100% (28 Dec 2020 10:05) (100% - 100%)                          13.7   4.83  )-----------( 257      ( 28 Dec 2020 10:29 )             40.8        138  |  101  |  11  ----------------------------<  104<H>  3.5   |  24  |  0.79    Ca    9.6      28 Dec 2020 10:29    TPro  7.5  /  Alb  4.7  /  TBili  0.2  /  DBili  x   /  AST  18  /  ALT  13  /  AlkPhos  80         PHYSICAL EXAM:  Gen: NAD  Skin: No rashes, bruises, or lesions  Head: Normocephalic, Atraumatic  Face: no edema, erythema, or fluctuance. Parotid glands soft without mass  Eyes: no scleral injection  Nose: Nares bilaterally patent, no discharge  Mouth: No Stridor / Drooling / Trismus.  Mucosa moist, tongue/uvula midline, s/p tonsillectomy, mild uvular erythema  Neck: Flat, supple, no lymphadenopathy, trachea midline, no masses  Lymphatic: No lymphadenopathy  Resp: breathing easily, no stridor  CV: no peripheral edema/cyanosis  GI: nondistended   Peripheral vascular: no JVD or edema  Neuro: facial nerve intact, no facial droop    Fiberoptic Indirect laryngoscopy:  (Scope #2 used)  Reason for Laryngoscopy: Throat pain    Patient was unable to cooperate with mirror.  Nasopharynx, oropharynx, and hypopharynx clear, no bleeding. Tongue base, posterior pharyngeal wall, vallecula, epiglottis, and subglottis appear normal. Lingual tonsils are enlarged. Mild diffuse erythema with exudates, no edema, pooling of secretions, masses or lesions. Airway patent, no foreign body visualized. No glottic/supraglottic edema. Mild arytenoid edema and erythema. True vocal cords, vestibular folds, ventricles, pyriform sinuses, and aryepiglottic folds appear normal bilaterally. Vocal cords mobile with good contact b/l.    EXAM: CT NECK SOFT TISSUE IC      PROCEDURE DATE: 2020    INTERPRETATION: CT OF THE NECK WITH CONTRAST    CLINICAL INDICATION: Acute onset of throat pain and difficulty swallowing; rule out retropharyngeal abscess.    TECHNIQUE: Axial CT images of the neck were obtained. Sagittal and coronal reformats were then generated off this initial set. Dose optimization techniques were utilized including kVp/mA modulation (along with iterative reconstructions).    DOSE REPORT: Total Exam .50 mGy-cm    CONTRAST: 70 mL of Omnipaque 300 non-ionic contrast was administered intravenously.    COMPARISON: No prior studies have been submitted for comparison.    FINDINGS:    The examination shows no abnormality of the skull base. The visualized tympanomastoid airspaces spaces are without significant fluid opacification. There is no significant abnormality of the paranasal sinuses or nasal fossa structures.    The visualized intracranial and orbital soft tissues are normal.    Both parotid and submandibular glands are within normal limits as are both lobes of the thyroid gland.    There is a peripherally enhancing fluid collection in the soft palate measuring 1.7 x 1.1 x 2.8 cm in TRV, AP and craniocaudal dimensions. There is diffuse edema of the uvula with deviation to the right. The base of tongue, floor of mouth structures, larynx, and visualized trachea are all grossly normal.    There are prominent but not enlarged lymph nodes in bilateral level 2A measuring 1.0 x 0.8 cm on the right, likely reactive in nature.    The lung apices are clear.    IMPRESSION:    There is a peripherally enhancing fluid collection the soft palate and uvula, suspicious for an early soft palatal abscess. There is diffuse edema of the uvula.    There are bilateral level 2A reactive lymph nodes.

## 2020-12-28 NOTE — ED PROVIDER NOTE - NS ED ROS FT
Constitutional: no fever and no chills  Eyes: no discharge, no irritation, no pain, no visual changes  ENMT: no ear pain or hearing loss, (+) dysphagia, (+) throat pain  Neck: (+) anterior neck pain, no stiffness, no swollen glands  CV: no chest pain, no palpitations, no edema  Resp: no cough, no shortness of breath  Abd: no abdominal pain, no nausea or vomiting, no diarrhea  : no dysuria, no hematuria  MSK: no back pain, no neck pain, no joint pain  Neuro: no LOC, no gait abnormality, no headache, no sensory deficits, no weakness  Skin: no rashes, no lacerations, no lesions

## 2020-12-28 NOTE — ED PROVIDER NOTE - ATTENDING CONTRIBUTION TO CARE
I performed a history and physical exam of the patient and discussed their management with the resident. I reviewed the resident's note and agree with the documented findings and plan of care. My medical decision making and observations are found above.    59 y/o F PMH HTN, migraine HA p/w acute onset of throat/neck pain and difficulty swallowing at 6am this morning. During triage, patient complaining of difficulty breathing and rushed back for eval. ENT called for emergent scope to eval for airway closure. Noted to have mild lingual tonsil swelling but clear larynx/epiglottis. On exam, patient mildly dyspneic and anxious appearing but no stridor. +maintaining secretions, clear lungs. AAOX4. abd soft, ntnd. 2+ pulses in distal extremities b/l. CT neck to evaluate for rpa/pta/paratracheal abscess. Patient protecting airway - no indication for emergent intubation. Will start abx per ENT given lingual tonsilar swelling. ct, labs, reassess, possibly cdu for obs.

## 2020-12-28 NOTE — ED CDU PROVIDER DISPOSITION NOTE - PLAN OF CARE
**CDU ATTENDING ADDENDUM (Dr. Artis Oconnor): Goals of care include resolution of emergent/urgent symptoms and concerns, and restoration to baseline level of homeostasis.

## 2020-12-28 NOTE — ED CDU PROVIDER DISPOSITION NOTE - PATIENT PORTAL LINK FT
You can access the FollowMyHealth Patient Portal offered by [facility] by registering at the following website: [email or web address]. By joining [facility]’s FollowMyHealth portal, you will also be able to view your health information using other applications (apps) compatible with our system.

## 2020-12-28 NOTE — ED ADULT NURSE NOTE - NSIMPLEMENTINTERV_GEN_ALL_ED
Implemented All Universal Safety Interventions:  [place] to call system. Call bell, personal items and telephone within reach. Instruct patient to call for assistance. Room bathroom lighting operational. Non-slip footwear when patient is off stretcher. Physically safe environment: no spills, clutter or unnecessary equipment. Stretcher in lowest position, wheels locked, appropriate side rails in place.

## 2020-12-28 NOTE — ED CDU PROVIDER DISPOSITION NOTE - CARE PROVIDER_API CALL
Suzie Ang  OTOLARYNGOLOGY  [address] [place] [address], [address]  [place] [address]  Phone: [phone]  Fax: [phone]  Follow Up Time:

## 2020-12-28 NOTE — ED PROVIDER NOTE - PROGRESS NOTE DETAILS
Shanthi EM/IM PGY3: Pt evaluated by ENT urgently in the ED. Laryngoscopy showed mild swelling of lingual tonsil, no evidence of epiglottitis or airway compromise. Given some mild exudate seen ENT would recommend antibiotic therapy at this time. Ag attg: spoke with ent - recommend cdu for abx. will not drain at this time. Patient coninues to be nontoxic appearing. maintaining secretions, patent airway. no resp distress.

## 2020-12-28 NOTE — ED CDU PROVIDER INITIAL DAY NOTE - OBJECTIVE STATEMENT
61 y/o F PMH HTN, migraine HA p/w acute onset of throat/neck pain and difficulty swallowing at 6am this morning. Was in normal state of health until this AM. States she feels like she'd be unable to swallow even liquids. No trouble breathing at this time. Has not had symptoms like this previously. Pt notes nasal congestion for the past week, denies other upper respiratory complaints. She denies any fevers/chills, HA, chest pain, SOB, palpitations, cough, wheezing, abd pain, n/v/d, urinary sxs, joint pain, or rashes. Notes she ate slimfast this morning, otherwise has not eaten anything else out of the ordinary for her. UTD on vaccines.  ED Course: labs including TSH non-actionable. COVID negative. CXR without acute pathology. CT neck shows evidence of early soft palate abscess, uvula swelling, and reactive LNs. Pt evaluated by ENT, recommend CDU for IV abx, steroids, pain control, frequent eval.

## 2020-12-28 NOTE — CONSULT NOTE ADULT - PROBLEM SELECTOR RECOMMENDATION 9
-Pending COVID 19 swab  -Dispo per ED, however, no acute in house ENT intervention.  -Recommend abx  -Soft diet/liquids as tolerated -CDU for observation on IV abx and steroids  -Will follow closely to ensure abscess does not enlarge requiring drainage  -Soft diet/liquids as tolerated

## 2020-12-28 NOTE — CONSULT NOTE ADULT - ASSESSMENT
61 yo female w acute onset throat pain w globus sensation noticed at 6 am. WBC WNL, afebrile. Laryngoscopy revealed mild diffuse erythema with exudates and lingual tonsil enlargement, NO edema. Airway patent. Likely URI.  61 yo female w acute onset throat pain w globus sensation noticed at 6 am. WBC WNL, afebrile. Laryngoscopy revealed mild diffuse erythema with exudates and lingual tonsil enlargement, NO edema. Airway patent. Likely URI.     ***CT impression  a peripherally enhancing fluid collection in the soft palate measuring 1.7 x 1.1 x 2.8 cm

## 2020-12-28 NOTE — ED ADULT NURSE NOTE - OBJECTIVE STATEMENT
61 y/o female pmh HTN, ectopic pregnancy, arrives to ED c/o throat pain and difficulty swallowing. Patient sent to ED by telemedicine MD concerned for epiglottitis. Patient reports waking up this morning with symptoms, like "something is stuck in my throat". Patient stating she is having soreness in throat, with productive loose cough. Patient is a/ox4, speaking in full sentences, no garbled speech, or sound of airway obstruction. ENT called by ED MD and at bedside for assessment. Denies fever/chills, shortness of breath, chest pain, abdomen pain, n/v/d. No sick contacts, no urinary symptoms. BL IV placed as documented, VSS, airway patent and O2 sat 100%.

## 2020-12-29 VITALS
TEMPERATURE: 98 F | HEART RATE: 91 BPM | DIASTOLIC BLOOD PRESSURE: 82 MMHG | SYSTOLIC BLOOD PRESSURE: 124 MMHG | RESPIRATION RATE: 18 BRPM | OXYGEN SATURATION: 98 %

## 2020-12-29 LAB
ALBUMIN SERPL ELPH-MCNC: 4.1 G/DL — SIGNIFICANT CHANGE UP (ref 3.3–5)
ALP SERPL-CCNC: 72 U/L — SIGNIFICANT CHANGE UP (ref 40–120)
ALT FLD-CCNC: 11 U/L — SIGNIFICANT CHANGE UP (ref 10–45)
ANION GAP SERPL CALC-SCNC: 11 MMOL/L — SIGNIFICANT CHANGE UP (ref 5–17)
AST SERPL-CCNC: 13 U/L — SIGNIFICANT CHANGE UP (ref 10–40)
BILIRUB SERPL-MCNC: 0.3 MG/DL — SIGNIFICANT CHANGE UP (ref 0.2–1.2)
BUN SERPL-MCNC: 9 MG/DL — SIGNIFICANT CHANGE UP (ref 7–23)
CALCIUM SERPL-MCNC: 9.1 MG/DL — SIGNIFICANT CHANGE UP (ref 8.4–10.5)
CHLORIDE SERPL-SCNC: 104 MMOL/L — SIGNIFICANT CHANGE UP (ref 96–108)
CO2 SERPL-SCNC: 21 MMOL/L — LOW (ref 22–31)
CREAT SERPL-MCNC: 0.69 MG/DL — SIGNIFICANT CHANGE UP (ref 0.5–1.3)
GLUCOSE SERPL-MCNC: 144 MG/DL — HIGH (ref 70–99)
HCT VFR BLD CALC: 39.3 % — SIGNIFICANT CHANGE UP (ref 34.5–45)
HGB BLD-MCNC: 12.9 G/DL — SIGNIFICANT CHANGE UP (ref 11.5–15.5)
MCHC RBC-ENTMCNC: 29.9 PG — SIGNIFICANT CHANGE UP (ref 27–34)
MCHC RBC-ENTMCNC: 32.8 GM/DL — SIGNIFICANT CHANGE UP (ref 32–36)
MCV RBC AUTO: 91.2 FL — SIGNIFICANT CHANGE UP (ref 80–100)
NRBC # BLD: 0 /100 WBCS — SIGNIFICANT CHANGE UP (ref 0–0)
PLATELET # BLD AUTO: 251 K/UL — SIGNIFICANT CHANGE UP (ref 150–400)
POTASSIUM SERPL-MCNC: 4.2 MMOL/L — SIGNIFICANT CHANGE UP (ref 3.5–5.3)
POTASSIUM SERPL-SCNC: 4.2 MMOL/L — SIGNIFICANT CHANGE UP (ref 3.5–5.3)
PROT SERPL-MCNC: 6.7 G/DL — SIGNIFICANT CHANGE UP (ref 6–8.3)
RBC # BLD: 4.31 M/UL — SIGNIFICANT CHANGE UP (ref 3.8–5.2)
RBC # FLD: 12.5 % — SIGNIFICANT CHANGE UP (ref 10.3–14.5)
SODIUM SERPL-SCNC: 136 MMOL/L — SIGNIFICANT CHANGE UP (ref 135–145)
WBC # BLD: 5.29 K/UL — SIGNIFICANT CHANGE UP (ref 3.8–10.5)
WBC # FLD AUTO: 5.29 K/UL — SIGNIFICANT CHANGE UP (ref 3.8–10.5)

## 2020-12-29 PROCEDURE — 82947 ASSAY GLUCOSE BLOOD QUANT: CPT

## 2020-12-29 PROCEDURE — 82803 BLOOD GASES ANY COMBINATION: CPT

## 2020-12-29 PROCEDURE — 83605 ASSAY OF LACTIC ACID: CPT

## 2020-12-29 PROCEDURE — 84132 ASSAY OF SERUM POTASSIUM: CPT

## 2020-12-29 PROCEDURE — 93005 ELECTROCARDIOGRAM TRACING: CPT | Mod: 76

## 2020-12-29 PROCEDURE — 85014 HEMATOCRIT: CPT

## 2020-12-29 PROCEDURE — 87635 SARS-COV-2 COVID-19 AMP PRB: CPT

## 2020-12-29 PROCEDURE — 85610 PROTHROMBIN TIME: CPT

## 2020-12-29 PROCEDURE — 84295 ASSAY OF SERUM SODIUM: CPT

## 2020-12-29 PROCEDURE — 85025 COMPLETE CBC W/AUTO DIFF WBC: CPT

## 2020-12-29 PROCEDURE — 82330 ASSAY OF CALCIUM: CPT

## 2020-12-29 PROCEDURE — 86900 BLOOD TYPING SEROLOGIC ABO: CPT

## 2020-12-29 PROCEDURE — 86901 BLOOD TYPING SEROLOGIC RH(D): CPT

## 2020-12-29 PROCEDURE — 80053 COMPREHEN METABOLIC PANEL: CPT

## 2020-12-29 PROCEDURE — 82435 ASSAY OF BLOOD CHLORIDE: CPT

## 2020-12-29 PROCEDURE — 99285 EMERGENCY DEPT VISIT HI MDM: CPT | Mod: 25

## 2020-12-29 PROCEDURE — 86850 RBC ANTIBODY SCREEN: CPT

## 2020-12-29 PROCEDURE — 99217: CPT

## 2020-12-29 PROCEDURE — 85018 HEMOGLOBIN: CPT

## 2020-12-29 PROCEDURE — 96376 TX/PRO/DX INJ SAME DRUG ADON: CPT | Mod: XU

## 2020-12-29 PROCEDURE — 70491 CT SOFT TISSUE NECK W/DYE: CPT

## 2020-12-29 PROCEDURE — G0378: CPT

## 2020-12-29 PROCEDURE — 71045 X-RAY EXAM CHEST 1 VIEW: CPT

## 2020-12-29 PROCEDURE — 96375 TX/PRO/DX INJ NEW DRUG ADDON: CPT

## 2020-12-29 PROCEDURE — 99283 EMERGENCY DEPT VISIT LOW MDM: CPT

## 2020-12-29 PROCEDURE — 84443 ASSAY THYROID STIM HORMONE: CPT

## 2020-12-29 PROCEDURE — 96374 THER/PROPH/DIAG INJ IV PUSH: CPT | Mod: XU

## 2020-12-29 PROCEDURE — 85027 COMPLETE CBC AUTOMATED: CPT

## 2020-12-29 PROCEDURE — 85730 THROMBOPLASTIN TIME PARTIAL: CPT

## 2020-12-29 PROCEDURE — 31505 DIAGNOSTIC LARYNGOSCOPY: CPT

## 2020-12-29 RX ORDER — ASPIRIN/CALCIUM CARB/MAGNESIUM 324 MG
325 TABLET ORAL ONCE
Refills: 0 | Status: COMPLETED | OUTPATIENT
Start: 2020-12-29 | End: 2020-12-29

## 2020-12-29 RX ORDER — ASPIRIN/CALCIUM CARB/MAGNESIUM 324 MG
325 TABLET ORAL ONCE
Refills: 0 | Status: DISCONTINUED | OUTPATIENT
Start: 2020-12-29 | End: 2020-12-29

## 2020-12-29 RX ADMIN — AMPICILLIN SODIUM AND SULBACTAM SODIUM 200 GRAM(S): 250; 125 INJECTION, POWDER, FOR SUSPENSION INTRAMUSCULAR; INTRAVENOUS at 11:32

## 2020-12-29 RX ADMIN — AMPICILLIN SODIUM AND SULBACTAM SODIUM 200 GRAM(S): 250; 125 INJECTION, POWDER, FOR SUSPENSION INTRAMUSCULAR; INTRAVENOUS at 05:55

## 2020-12-29 RX ADMIN — Medication 325 MILLIGRAM(S): at 09:15

## 2020-12-29 RX ADMIN — Medication 6 MILLIGRAM(S): at 08:35

## 2020-12-29 RX ADMIN — SODIUM CHLORIDE 125 MILLILITER(S): 9 INJECTION INTRAMUSCULAR; INTRAVENOUS; SUBCUTANEOUS at 03:33

## 2020-12-29 RX ADMIN — Medication 975 MILLIGRAM(S): at 09:13

## 2020-12-29 NOTE — ED CDU PROVIDER SUBSEQUENT DAY NOTE - MEDICAL DECISION MAKING DETAILS
**CDU ATTENDING MEDICAL DECISION MAKING/SYNTHESIS (Dr. Artis Oconnor): I have reviewed the Chief Concern(s), the HPI, the ROS, and have directly performed and confirmed the findings on the Physical Examination. I have reviewed the medical decision making with all providers, as applicable. The PROBLEM REPRESENTATION at this time is: XX.   The MOST LIKELY DIAGNOSIS, and the LIST OF DIFFERENTIAL DIAGNOSES, includes (but is not limited to) the following: XX.   The likelihood of each of these diagnoses has been appropriately considered in the context of this patient's presentation and my evaluation. PLAN: as described in EMR, including diagnostics, therapeutics and consultation as clinically warranted. I will continue to reevaluate the patient, including the results of all testing, and monitor response to therapy throughout the patient's course in the ED. **CDU ATTENDING MEDICAL DECISION MAKING/SYNTHESIS (Dr. Artis Oconnor): I have reviewed the Chief Concern(s), the HPI, the ROS, and have directly performed and confirmed the findings on the Physical Examination. I have reviewed the medical decision making with all providers, as applicable. The PROBLEM REPRESENTATION at this time is: 54-year-old woman with history of Hypertension and migraines presenting to CDU for observation of upper oral/palatal abscess. Presented to ED, had CT, serum diagnostics, and therapeutics. NO acute deterioration up to this time in the CDU. CDU diagnostics and consultation(s) (if applicable) up to this time have been reviewed, acknowledged and noted. Awaiting reassessments by ENT later this AM.   The MOST LIKELY DIAGNOSIS, and the LIST OF DIFFERENTIAL DIAGNOSES, includes (but is not limited to) the following: palatal/peritonsillar abscess (obvious), mediastinitis, serious bacterial infection or sepsis/severe sepsis, shock, critical airway (NO evidence), Jd's angina or equivalent (NO evidence). The likelihood of each of these diagnoses has been appropriately considered in the context of this patient's presentation and my evaluation. PLAN: as described in EMR, including diagnostics, therapeutics and consultation as clinically warranted. I will continue to reevaluate the patient, including the results of all testing, and monitor response to therapy throughout the patient's course in the CDU.

## 2020-12-29 NOTE — ED ADULT NURSE REASSESSMENT NOTE - NS ED NURSE REASSESS COMMENT FT1
16.30. 07.00 Am Received the Pt from  HOWARD farley Pt is observed for throat pain  . . Received the Pt A&OX 4 obeys commands  oriented to the unit Cristine N/V/D fever chills cp SOB   Comfort care & safety measures continued  IV site looks clean & dry no signs of infiltration noted pt denies  pain IV site .  Pt is advised to call for help  call bell with in the reach pt verbalized the understanding . Pt states  she has minimal pain in the throat not in respiratory distress  no Drooling Continue to monitor
Report taken from Rosalina LAWRENCE. States patient had a good night with no complaints. Will continue to monitor.
Pt received from HOWARD Garcia. Pt oriented to CDU & plan of care was discussed. Pt endorses 5/10 throat pain worse with palpation & swallowing. Pt denies any difficulty breathing or swallowing and will notify RN or PA of any symptoms. Pt able to tolerate PO without any difficulty. Safety & comfort measures maintained. Call bell in reach. Will continue to monitor.

## 2020-12-29 NOTE — ED CDU PROVIDER SUBSEQUENT DAY NOTE - PHYSICAL EXAMINATION
GEN: Pt in NAD, non-toxic.   PSYCH: Affect appropriate.  EYES: Sclera white w/o injection.   ENT: Head NCAT. MMM, uvula erythematous, mildly edematous, midline. No evidence of airway obstruction, no trismus, no drooling, no dysphonia, no submental fullness/tenderness, no purulence in the oral cavity. No stridor. Neck supple FROM.  RESP: Speaking in full sentences. No tripod positioning. CTA b/l, no wheezes, rales, or rhonchi.   CARDIAC: RRR, clear distinct S1, S2, no appreciable murmurs.  ABD: Abdomen soft, non-tender, no HSM. No CVAT b/l.  VASC: No edema or calf tenderness.  SKIN: No rashes on the trunk. GEN: Pt in NAD, non-toxic.   PSYCH: Affect appropriate.  EYES: Sclera white w/o injection.   ENT: Head NCAT. MMM, uvula erythematous, mildly edematous, midline. No evidence of airway obstruction, no trismus, no drooling, no dysphonia, no submental fullness/tenderness, no purulence in the oral cavity. No stridor. Neck supple FROM.  RESP: Speaking in full sentences. No tripod positioning. CTA b/l, no wheezes, rales, or rhonchi.   CARDIAC: RRR, clear distinct S1, S2, no appreciable murmurs.  ABD: Abdomen soft, non-tender, no HSM. No CVAT b/l.  VASC: No edema or calf tenderness.  SKIN: No rashes on the trunk.  **CDU ATTENDING ADDENDUM (Dr. Artis Oconnor): I have reviewed and substantially contributed to the elements of the PE as documented above. I have directly performed an examination of this patient in conjunction with the other members (EM resident/PA/NP) of the patient care team.

## 2020-12-29 NOTE — ED CDU PROVIDER SUBSEQUENT DAY NOTE - ATTENDING CONTRIBUTION TO CARE
**CDU ATTENDING ADDENDUM (Dr. Artis Oconnor): I attest that I have directly examined this patient and reviewed and formulated the diagnostic and therapeutic management plan in collaboration with the advanced practitioner (NP, PA). Please see MDM note and remainder of EMR for findings from CC, HPI, ROS, and PE. **CDU ATTENDING ADDENDUM (Dr. Artis Oconnor): I attest that I have directly examined this patient and reviewed and formulated the diagnostic and therapeutic management plan in collaboration with the advanced practitioner (NP, PA). Please see MDM note and remainder of EMR for findings from CC, HPI, ROS, and PE. (Jacob/Joe)

## 2020-12-29 NOTE — PROGRESS NOTE ADULT - PROBLEM SELECTOR PLAN 1
CDU for observation on IV abx Unasyn) and steroids  -Will follow closely to ensure abscess does not enlarge requiring drainage  -Soft diet/liquids as tolerated. IV Unasyn dose at 12:00 today and then may be discharged  -Soft diet/liquids as tolerated.  Discharge home on Augmentin  F/U with Dr. Ang in 2 weeks

## 2020-12-29 NOTE — PROGRESS NOTE ADULT - ASSESSMENT
61 yo female w acute onset throat pain w globus sensation noticed at 6 am yesterday morning. WBC WNL, afebrile. Laryngoscopy revealed mild diffuse erythema with exudates and lingual tonsil enlargement, NO edema. Airway patent. Likely URI. Pt. is able to swallow today without pain.        59 yo female w acute onset throat pain w globus sensation noticed at 6 am yesterday morning. WBC WNL, afebrile. Laryngoscopy revealed mild diffuse erythema with exudates and lingual tonsil enlargement, NO edema. Airway patent. Likely URI. Pt. is able to swallow today without pain., and appears significantly improved. Pt. can be discharged today and was advised to follow up with Dr. Ang in 2 weeks.

## 2020-12-29 NOTE — ED CDU PROVIDER SUBSEQUENT DAY NOTE - PROGRESS NOTE DETAILS
**CDU ATTENDING ADDENDUM (Dr. Artis Oconnor): results of CDU diagnostics and consultation(s) (if applicable) up to this time have been reviewed, acknowledged and noted from home. Will personally evaluate patient once on site. CDU team Will continue to observe and monitor closely. CDU NOTE KAREN Diaz: pt resting, pain at throat 2/10. pt able to tolerate po last night. overall much better than yesterday. NAD VSS. CDU NOTE KAREN Diaz: pt evaluated by ENT- will come back with attending. CDU NOTE KAREN Diaz: pt evaluated by ENT- will come back with attending.  **CDU ATTENDING ADDENDUM (Dr. Artis Oconnor): agree with above notation by GERARDO Diaz. Patient serially evaluated throughout CDU course by CDU team. NO acute deterioration up to this time in the CDU. Personally reassessed. Some improvement. Agree with ENT attending reevaluation later this AM. CDU team Will continue to observe and monitor closely. **CDU ATTENDING ADDENDUM (Dr. Artis Oconnor): patient evaluated by ENT attending. Agrees with discharge home with close outpatient followup with her in the office. Medications as prescribed. Anticipatory guidance provided throughout ED course. CDU NOTE KAREN Diaz: pt seen by Dr. Gulshan pennington to d/c home to f/up oupt in her office in 1-2 weeks and to go home on Augmentin. no need for further steroids.

## 2020-12-29 NOTE — PROGRESS NOTE ADULT - SUBJECTIVE AND OBJECTIVE BOX
ENT ISSUE/POD: Throat pain    HPI:59 y/o F PMH HTN, migraine HA p/w acute onset of throat/neck pain and difficulty swallowing at 6am yesterday morning. Was in normal state of health until then. States she felt like she'd be unable to swallow even liquids. No trouble breathing at that  time. Has not had symptoms like this previously. Statescamacho felt like she had  a lump in her throat and symptoms were  temporarily relieved with sitting up. She denies any fevers/chills, chest pain, SOB, palpitations, wheezing, abd pain, n/v/d, urinary sxs, joint pain, or rashes.  Pt. states this morning she is able to swallow without pain (ate an omelet and was finishing up oatmeal). Pt. is able to swallow now without pain but is complaining of a headache.           PAST MEDICAL & SURGICAL HISTORY:  Female stress incontinence    Peptic ulcer    Migraines    Hypertension     (normal spontaneous vaginal delivery)  , ,     S/P laparoscopy   for ectopic pregnancy    S/P tooth extraction  age 23- 4 wisdom teeth    S/P tonsillectomy  ate 22      Allergies    dust (Other; Sneezing)  quinolines (Rash)  smoke- hoarseness (Other)    Intolerances      MEDICATIONS  (STANDING):  amphetamine/dextroamphetamine 30 milliGRAM(s) Oral two times a day  ampicillin/sulbactam  IVPB 3 Gram(s) IV Intermittent every 6 hours  aspirin enteric coated 325 milliGRAM(s) Oral once  sodium chloride 0.9%. 1000 milliLiter(s) (125 mL/Hr) IV Continuous <Continuous>    MEDICATIONS  (PRN):  acetaminophen   Tablet .. 975 milliGRAM(s) Oral every 6 hours PRN Moderate Pain (4 - 6)      Social History: see consult note    Family history: see consult note    ROS:   ENT: all negative except as noted in HPI   Pulm: denies SOB, cough, hemoptysis  Neuro: denies numbness/tingling, loss of sensation  Endo: denies heat/cold intolerance, excessive sweating      Vital Signs Last 24 Hrs  T(C): 36.7 (29 Dec 2020 08:41), Max: 37.1 (28 Dec 2020 15:49)  T(F): 98 (29 Dec 2020 08:41), Max: 98.8 (28 Dec 2020 15:49)  HR: 91 (29 Dec 2020 08:41) (75 - 107)  BP: 112/72 (29 Dec 2020 08:41) (103/66 - 146/97)  BP(mean): --  RR: 17 (29 Dec 2020 08:41) (17 - 22)  SpO2: 95% (29 Dec 2020 08:41) (95% - 100%)                          12.9   5.29  )-----------( 251      ( 29 Dec 2020 06:09 )             39.3        136  |  104  |  9   ----------------------------<  144<H>  4.2   |  21<L>  |  0.69    Ca    9.1      29 Dec 2020 06:09    TPro  6.7  /  Alb  4.1  /  TBili  0.3  /  DBili  x   /  AST  13  /  ALT  11  /  AlkPhos  72  12-29   PT/INR - ( 28 Dec 2020 10:29 )   PT: 10.3 sec;   INR: 0.85 ratio         PTT - ( 28 Dec 2020 10:29 )  PTT:30.8 sec    PHYSICAL EXAM:  Gen: NAD  Skin: No rashes, bruises, or lesions  Head: Normocephalic, Atraumatic  Face: no edema, erythema, or fluctuance. Parotid glands soft without mass  Eyes: no scleral injection  Nose: Nares bilaterally patent, no discharge  Mouth: No Stridor / Drooling / Trismus.  Mucosa moist, tongue/uvula midline, oropharynx clear,s/p tonsillectomy, mild uvular erythema  Neck: Flat, supple, no lymphadenopathy, trachea midline, no masses  Lymphatic: No lymphadenopathy  Resp: breathing easily, no stridor  Neuro: facial nerve intact, no facial droop           ENT ISSUE/POD: Throat pain    HPI:61 y/o F PMH HTN, migraine HA p/w acute onset of throat/neck pain and difficulty swallowing at 6am yesterday morning. Was in normal state of health until then. States she felt like she'd be unable to swallow even liquids. No trouble breathing at that  time. Has not had symptoms like this previously. Statescamacho felt like she had  a lump in her throat and symptoms were  temporarily relieved with sitting up. She denies any fevers/chills, chest pain, SOB, palpitations, wheezing, abd pain, n/v/d, urinary sxs, joint pain, or rashes.  Pt. states this morning she is able to swallow without pain (ate an omelet and was finishing up oatmeal). Pt. also states that she is coughing much more today than yesterday.            PAST MEDICAL & SURGICAL HISTORY:  Female stress incontinence    Peptic ulcer    Migraines    Hypertension     (normal spontaneous vaginal delivery)  , ,     S/P laparoscopy   for ectopic pregnancy    S/P tooth extraction  age 23- 4 wisdom teeth    S/P tonsillectomy  ate 22      Allergies    dust (Other; Sneezing)  quinolines (Rash)  smoke- hoarseness (Other)    Intolerances      MEDICATIONS  (STANDING):  amphetamine/dextroamphetamine 30 milliGRAM(s) Oral two times a day  ampicillin/sulbactam  IVPB 3 Gram(s) IV Intermittent every 6 hours  aspirin enteric coated 325 milliGRAM(s) Oral once  sodium chloride 0.9%. 1000 milliLiter(s) (125 mL/Hr) IV Continuous <Continuous>    MEDICATIONS  (PRN):  acetaminophen   Tablet .. 975 milliGRAM(s) Oral every 6 hours PRN Moderate Pain (4 - 6)      Social History: see consult note    Family history: see consult note    ROS:   ENT: all negative except as noted in HPI   Pulm: denies SOB, cough, hemoptysis  Neuro: denies numbness/tingling, loss of sensation  Endo: denies heat/cold intolerance, excessive sweating      Vital Signs Last 24 Hrs  T(C): 36.7 (29 Dec 2020 08:41), Max: 37.1 (28 Dec 2020 15:49)  T(F): 98 (29 Dec 2020 08:41), Max: 98.8 (28 Dec 2020 15:49)  HR: 91 (29 Dec 2020 08:41) (75 - 107)  BP: 112/72 (29 Dec 2020 08:41) (103/66 - 146/97)  BP(mean): --  RR: 17 (29 Dec 2020 08:41) (17 - 22)  SpO2: 95% (29 Dec 2020 08:41) (95% - 100%)                          12.9   5.29  )-----------( 251      ( 29 Dec 2020 06:09 )             39.3        136  |  104  |  9   ----------------------------<  144<H>  4.2   |  21<L>  |  0.69    Ca    9.1      29 Dec 2020 06:09    TPro  6.7  /  Alb  4.1  /  TBili  0.3  /  DBili  x   /  AST  13  /  ALT  11  /  AlkPhos  72  12-29   PT/INR - ( 28 Dec 2020 10:29 )   PT: 10.3 sec;   INR: 0.85 ratio         PTT - ( 28 Dec 2020 10:29 )  PTT:30.8 sec    PHYSICAL EXAM:  Gen: NAD  Skin: No rashes, bruises, or lesions  Head: Normocephalic, Atraumatic  Face: no edema, erythema, or fluctuance. Parotid glands soft without mass  Eyes: no scleral injection  Nose: Nares bilaterally patent, no discharge  Mouth: No Stridor / Drooling / Trismus.  Mucosa moist, tongue/uvula midline, oropharynx clear,s/p tonsillectomy, mild uvular erythema with irregular shaped outpouching near apex    Neck: Flat, supple, no lymphadenopathy, trachea midline, no masses  Lymphatic: No lymphadenopathy  Resp: breathing easily, no stridor  Neuro: facial nerve intact, no facial droop           ENT ISSUE/POD: Throat pain    HPI:61 y/o F PMH HTN, migraine HA p/w acute onset of throat/neck pain and difficulty swallowing at 6am yesterday morning. Was in normal state of health until then. States she felt like she'd be unable to swallow even liquids. No trouble breathing at that  time. Has not had symptoms like this previously. Statescamacho felt like she had  a lump in her throat and symptoms were  temporarily relieved with sitting up. She denies any fevers/chills, chest pain, SOB, palpitations, wheezing, abd pain, n/v/d, urinary sxs, joint pain, or rashes.  Pt. states this morning she is able to swallow without pain (ate an omelet and was finishing up oatmeal). Pt. also states that she is coughing much more today than yesterday.  WBC 5.9 and afebrile           PAST MEDICAL & SURGICAL HISTORY:  Female stress incontinence    Peptic ulcer    Migraines    Hypertension     (normal spontaneous vaginal delivery)  , ,     S/P laparoscopy   for ectopic pregnancy    S/P tooth extraction  age 23- 4 wisdom teeth    S/P tonsillectomy  ate 22      Allergies    dust (Other; Sneezing)  quinolines (Rash)  smoke- hoarseness (Other)    Intolerances      MEDICATIONS  (STANDING):  amphetamine/dextroamphetamine 30 milliGRAM(s) Oral two times a day  ampicillin/sulbactam  IVPB 3 Gram(s) IV Intermittent every 6 hours  aspirin enteric coated 325 milliGRAM(s) Oral once  sodium chloride 0.9%. 1000 milliLiter(s) (125 mL/Hr) IV Continuous <Continuous>    MEDICATIONS  (PRN):  acetaminophen   Tablet .. 975 milliGRAM(s) Oral every 6 hours PRN Moderate Pain (4 - 6)      Social History: see consult note    Family history: see consult note    ROS:   ENT: all negative except as noted in HPI   Pulm: denies SOB, cough, hemoptysis  Neuro: denies numbness/tingling, loss of sensation  Endo: denies heat/cold intolerance, excessive sweating      Vital Signs Last 24 Hrs  T(C): 36.7 (29 Dec 2020 08:41), Max: 37.1 (28 Dec 2020 15:49)  T(F): 98 (29 Dec 2020 08:41), Max: 98.8 (28 Dec 2020 15:49)  HR: 91 (29 Dec 2020 08:41) (75 - 107)  BP: 112/72 (29 Dec 2020 08:41) (103/66 - 146/97)  BP(mean): --  RR: 17 (29 Dec 2020 08:41) (17 - 22)  SpO2: 95% (29 Dec 2020 08:41) (95% - 100%)                          12.9   5.29  )-----------( 251      ( 29 Dec 2020 06:09 )             39.3    12    136  |  104  |  9   ----------------------------<  144<H>  4.2   |  21<L>  |  0.69    Ca    9.1      29 Dec 2020 06:09    TPro  6.7  /  Alb  4.1  /  TBili  0.3  /  DBili  x   /  AST  13  /  ALT  11  /  AlkPhos  72  12-29   PT/INR - ( 28 Dec 2020 10:29 )   PT: 10.3 sec;   INR: 0.85 ratio         PTT - ( 28 Dec 2020 10:29 )  PTT:30.8 sec    PHYSICAL EXAM:  Gen: NAD  Skin: No rashes, bruises, or lesions  Head: Normocephalic, Atraumatic  Face: no edema, erythema, or fluctuance. Parotid glands soft without mass  Eyes: no scleral injection  Nose: Nares bilaterally patent, no discharge  Mouth: No Stridor / Drooling / Trismus.  Mucosa moist, tongue/uvula midline, oropharynx clear,s/p tonsillectomy, mild uvular erythema with irregular shaped outpouching near apex    Neck: Flat, supple, no lymphadenopathy, trachea midline, no masses  Lymphatic: No lymphadenopathy  Resp: breathing easily, no stridor  Neuro: facial nerve intact, no facial droop

## 2020-12-29 NOTE — ED CDU PROVIDER SUBSEQUENT DAY NOTE - NS ED ROS FT
+dysphagia, odynophagia +dysphagia, odynophagia  **CDU ATTENDING ADDENDUM (Dr. Artis Oconnor): During my interview with the patient, I have personally obtained and/or have directly verified the elements in the past medical/surgical history and other histories as noted earlier in the EMR, in conjunction with the other members (EM resident/PA/NP) of the patient care team. I have also personally obtained and/or have directly verified/reviewed the review of systems as documented below, in conjunction with the other members (EM resident/PA/NP) of the patient care team.

## 2020-12-29 NOTE — ED CDU PROVIDER SUBSEQUENT DAY NOTE - HISTORY
CDU PROGRESS NOTE PA DAMARIS: Pt resting comfortably, NAD, VSS. Uvula erythematous, mildly edematous, midline. No evidence of airway obstruction, no trismus, no drooling, no stridor. Neck supple FROM. Pt reports feeling mild improvement in symptoms, declines analgesics now. Will continue to monitor. CDU PROGRESS NOTE PA DAMARIS: Pt resting comfortably, NAD, VSS. Uvula erythematous, mildly edematous, midline. No evidence of airway obstruction, no trismus, no drooling, no stridor. Neck supple FROM. Pt reports feeling mild improvement in symptoms, declines analgesics now. Will continue to monitor.  **CDU ATTENDING ADDENDUM (Dr. Artis Oconnor): I attest that I have directly examined this patient and elicited a comparable history of present illness and review of systems with my collaborating provider (NP/PA). I attest that I have made significant contributions to the documentation where necessary and as noted in the EMR.

## 2021-02-26 ENCOUNTER — TRANSCRIPTION ENCOUNTER (OUTPATIENT)
Age: 61
End: 2021-02-26
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
## 2021-08-03 ENCOUNTER — TRANSCRIPTION ENCOUNTER (OUTPATIENT)
Age: 61
End: 2021-08-03

## 2021-12-22 ENCOUNTER — TRANSCRIPTION ENCOUNTER (OUTPATIENT)
Age: 61
End: 2021-12-22

## 2025-07-14 NOTE — ED ADULT TRIAGE NOTE - CHIEF COMPLAINT QUOTE
HISTORY & PHYSICAL - PREEOP    DATE:  7/14/2025  SURGERYDATE: 8/1/2025   SURGEON:             Prashant Castro MD  PCP:   Caprice Olivera NP    CHIEF COMPLAINT: Screening colonoscopy    HPI:  Daniela Hwang is a 62 year old female seen at the request of Prashant Castro MD for preoperative medical review prior to scheduled 8/1/2025 to be done at [facility].  A copy of this consultation is provided for review in advance.    Daniela will be having a screening colonoscopy    Daniela has a history: 2/5/2024 STEMI criteria showing anterior/anterolateral and inferior JAYA waves. She had a 100% occlusion of her LAD. She underwent PCI of the mid LAD, however there was significant thrombus burden which embolized distally to the apical LAD .abnormalities and EF of 35%  ICD placement 10/9/2024 At. [facility].   chronic congestive heart failure with decreased EF. Taking bumex  quit smoking on 2/4/2024. Insomnia, Dyslipidemia, Vitamin D Deficiency, stage III CKD, and GERD.   Patient Active Problem List   Diagnosis   • IFG (impaired fasting glucose)   • Intractable migraine without status migrainosus   • Decreased GFR   • ST elevation myocardial infarction involving left anterior descending (LAD) coronary artery  (CMD)   • History of myocardial infarction   • Vitamin D deficiency   • DDD (degenerative disc disease), cervical   • Stage 3a chronic kidney disease (CKD)  (CMD)   • GERD (gastroesophageal reflux disease)   • Chronic heart failure  (CMD)   • Ischemic cardiomyopathy   • Ejection fraction < 50%   • ICD (implantable cardioverter-defibrillator) in place   • Lumbar radiculopathy   • Cervical pain (neck)       Past Medical History:   Diagnosis Date   • GERD (gastroesophageal reflux disease)    • ST elevation myocardial infarction involving left anterior descending (LAD) coronary artery  (CMD) 02/18/2024       Past Surgical History:   Procedure Laterality Date   • Cardiac defibrillator placement  10/09/2024    • Tubal ligation Bilateral     Patient unsure of year       Current Outpatient Medications   Medication Sig Dispense Refill   • fluconazole (Diflucan) 150 MG tablet 1 po day you start antibiotic and repeat at end of dose 2 tablet 0   • clopidogrel (PLAVIX) 75 MG tablet Take 1 tablet by mouth daily. 90 tablet 1   • Temazepam 30 MG capsule Take 1 capsule by mouth nightly as needed for Sleep. 45 capsule 0   • topiramate (TOPAMAX) 25 MG tablet Take 1 tablet by mouth in the morning and 1 tablet in the evening. 180 tablet 1   • nystatin (MYCOSTATIN) 763266 UNIT/GM cream Apply 1 Application topically in the morning and 1 Application in the evening. 30 g 11   • atorvastatin (LIPITOR) 40 MG tablet Take 1 tablet by mouth daily. 90 tablet 3   • esomeprazole (NexIUM) 40 MG capsule Take 1 capsule by mouth daily (before breakfast). 90 capsule 0   • ergocalciferol (DRISDOL) 1.25 mg (50,000 units) capsule Take 1 capsule by mouth 1 day a week. 12 capsule 1   • hydrALAZINE (APRESOLINE) 50 MG tablet Take 1 tablet by mouth in the morning and 1 tablet in the evening. 180 tablet 1   • metoPROLOL succinate (TOPROL-XL) 25 MG 24 hr tablet Take 37.5 mg by mouth daily.     • bumetanide (BUMEX) 0.5 MG tablet Take 0.5 mg by mouth daily.     • nitroGLYCERIN (NITROSTAT) 0.4 MG sublingual tablet Place 0.4 mg under the tongue every 5 minutes as needed.     • aspirin 81 MG chewable tablet Chew 1 tablet by mouth daily. 30 tablet 10   • polyethylene glycol (MIRALAX) 17 GM/SCOOP powder Take 17 g by mouth every 24 hours as needed.       No current facility-administered medications for this visit.       ALLERGIES:   Allergen Reactions   • Aloe Vera Other (See Comments)   • Lasix RASH     Rash around her mouth   • Sulfa Antibiotics HIVES and RASH   • Empagliflozin GI UPSET   • Furosemide Other (See Comments)     Mouth sores   • Isosorbide HEADACHES     Previous adverse reaction to anesthesia:  None    Family History   Problem Relation Age of Onset   •  Stroke Mother    • Heart disease Mother    • Diabetes Mother         t2dm   • Myocardial Infarction Mother    • Myocardial Infarction Father    • Natural Causes Father    • Dementia/Alzheimers Father    • Stroke Father    • Heart disease Father      No known family history of adverse reaction to anesthesia or bleeding diathesis.    Social History     Tobacco Use   • Smoking status: Former     Current packs/day: 0.25     Average packs/day: 0.3 packs/day for 48.9 years (12.2 ttl pk-yrs)     Types: Cigarettes     Start date: 9/1/1976     Passive exposure: Current ( smokes)   • Smokeless tobacco: Never   • Tobacco comments:     3-4 cigarettes a day (1 pack a week)   Vaping Use   • Vaping status: never used   Substance Use Topics   • Alcohol use: Not Currently     Comment: Sober 20 years   • Drug use: Not Currently       REVIEW OF SYSTEMS:  Hematologic:  Denies bleeding or bruising tendencies.  Previous history of blood clots - none  Constitutional:  Denies fever or chills, no weight loss  Eyes:  Denies change in visual acuity or diplopia  HENT:  Denies nasal congestion, sore throat or dysphagia  Respiratory:  Denies cough + dyspnea with exertion  Cardiovascular:  Denies chest pain, palpitations or edema   GI:  Denies abdominal pain, nausea, vomiting, bloody stools or diarrhea   :  Denies dysuria, frequency or flank pain   Integument:  Denies rash, changing lesions   Neurologic:  Denies headache, focal weakness or sensory changes   Endocrine:  Denies polyuria or polydipsia, no temperature intolerance   Lymphatic:  Denies swollen glands   Psychiatric:  Denies depression or anxiety     PHYSICAL EXAM:  Visit Vitals  BP (!) 168/92 (BP Location: RUE - Right upper extremity, Patient Position: Sitting, Cuff Size: Regular)   Pulse 72   Resp 16   Ht 5' 4\" (1.626 m)   Wt 86 kg (189 lb 11.2 oz)   SpO2 97%   BMI 32.56 kg/m²     CONSTITUTIONAL: Daniela  is a 62 year old female who is pleasant and in no acute distress. She  is  well developed, well nourished, and is alert and orientated.  EYES:  PERRLA bilaterally, EOMs intact conjunctiva pink   HENT:  Normocephalic and atraumatic,  With symmetrical facial features. Auditory acuity grossly intact, TMs pearly gray and canals without inflammation or cerumen. Nasal and oropharyngeal mucosa pink and moist without lesions and well hydrated. Oropharynx without lesions. Dentition in good repair.  NECK: Supple, normal range of motion, no tenderness, no masses, no thyromegaly with no cervical lymphadenopathy  RESPIRATORY:  No respiratory distress, normal breath sounds, no wheezing, rhonchi or rales.    CARDIOVASCULAR:  Normal rate, normal rhythm, no murmurs, no gallops, no rubs.  Peripheral pulses intact in upper & lower extremeties.  No carotid or abdominal bruits.   GI:  Soft, nondistended, normal bowel sounds in all 4 quadrants, non-tender to palpation, no hepatomegaly or splenomegaly, no mass, no rebound, no guarding   :  No costovertebral angle tenderness   MUSCULOSKELETAL:  No edema, no tenderness, no deformities.   BACK: no tenderness, normal configuration  INTEGUMENT:  Well hydrated, no rash   LYMPHATIC:  No  axillary or inguinal lymphadenopathy noted   NEUROLOGIC:   CN 2-12 intact, motor strength intact in upper & lower extremeties, light touch intact in upper & lower extremeties.  No apraxia or ataxia.  PSYCHIATRIC:  Speech and behavior appropriate     LABS:   Latest Reference Range & Units 07/07/25 09:22   Sodium 135 - 145 mmol/L 145   Potassium 3.4 - 5.1 mmol/L 5.1   Chloride 97 - 110 mmol/L 111 (H)   CO2 21 - 32 mmol/L 26   ANION GAP 7 - 19 mmol/L 13   Glucose 70 - 99 mg/dL 97   BUN 6 - 20 mg/dL 29 (H)   Creatinine 0.51 - 0.95 mg/dL 1.69 (H)   BUN/CREATININE RATIO 7 - 25  17   Glomerular Filtration Rate >=60  34 (L)   CALCIUM 8.4 - 10.2 mg/dL 8.8   TOTAL BILIRUBIN 0.2 - 1.0 mg/dL <0.2 (L)   AST/SGOT <=37 Units/L 29   ALT/SGPT <64 Units/L 26   ALK PHOSPHATASE 45 - 117 Units/L 85    Albumin 3.4 - 5.0 g/dL 3.4   GLOBULIN 2.0 - 4.0 g/dL 3.1   A/G Ratio, Serum 1.0 - 2.4  1.1   TOTAL PROTEIN 6.4 - 8.2 g/dL 6.5   WBC 4.2 - 11.0 K/mcL 5.6   RBC 4.00 - 5.20 mil/mcL 3.63 (L)   HGB 12.0 - 15.5 g/dL 9.9 (L)   HCT 36.0 - 46.5 % 34.5 (L)   MCV 78.0 - 100.0 fl 95.0   MCH 26.0 - 34.0 pg 27.3   MCHC 32.0 - 36.5 g/dL 28.7 (L)   RDW-SD 39.0 - 50.0 fL 56.1 (H)   RDW-CV 11.0 - 15.0 % 16.0 (H)    - 450 K/mcL 245   NRBC <=0 /100 WBC 0   Neutrophil % 51   LYMPH % 26   MONO % 15   EOSIN % 6   BASO % 1   Absolute Neutrophil 1.8 - 7.7 K/mcL 2.9   Absolute Lymph 1.0 - 4.0 K/mcL 1.4   Absolute Mono 0.3 - 0.9 K/mcL 0.8   Absolute Eos 0.0 - 0.5 K/mcL 0.3   Absolute Baso 0.0 - 0.3 K/mcL 0.0   Immature Granulocytes % 1   Absolute Immature Granulocytes 0.0 - 0.2 K/mcL 0.0      Latest Reference Range & Units 07/07/25 08:00   COLOR  Straw   CLARITY  Cloudy   SPECIFIC GRAVITY 1.005 - 1.030  1.011   pH 5.0 - 7.0  6.5   GLUCOSE(URINE) Negative mg/dL Negative   KETONES Negative mg/dL Negative   PROTEIN(URINE) Negative mg/dL Trace !   BLOOD Negative  Negative   LEUKOCYTE ESTERASE Negative  Large !   NITRITE Negative  Negative   BILIRUBIN Negative  Negative   UROBILINOGEN 0.2, 1.0 mg/dL 0.2   ERYTHROCYTES, URINALYSIS None Seen, 1 to 2 /hpf 3 to 5 !   LEUKOCYTES, URINALYSIS None Seen, 1 to 5 /hpf 26 to 100 !   Squamous EPI'S None Seen, 1 to 5 /hpf 1 to 5   BACTERIA, URINALYSIS None Seen /hpf Moderate !   HYALINE CASTS, URINALYSIS None Seen, 1 to 5 /lpf None Seen   MUCOUS  Present       RADIOLOGY:  Results for orders placed or performed in visit on 07/14/25   Electrocardiogram 12-Lead    Narrative    Normal Sinus Rhythm, Cannot rule out inferior infarct, age undetermined, Anterior infarct age undetermined, T Wave abnormality, consider lateral ischemia Abnormal ECG                Results for orders placed in visit on 04/01/25    XR CHEST PA AND LATERAL 2 VIEWS    Impression  No acute cardiopulmonary  abnormality.    Electronically Signed by: Enzo Lam DO  Signed on: 4/4/2025 12:27 PM  Created on Workstation ID: [id number]  Signed on Workstation ID: [id number]      IMPRESSION: This is a very pleasant 62-year-old female who will be having a screening colonoscopy with Dr. Bryce Escalante on August 1, 2025 at the [facility]. She has stage III CKD: GFR: 34      Preop instructions:    No eating or drinking for 12 hours prior to surgery    Stop all over the counter medication: Vitamins and herbals for 7 days prior to surgery. Some of these  Medications can cause increase bleeding and interfere with anesthesia.     No ibuprofen, aspirin, naproxen, Excedrin or any NSAIDs 7 days prior to surgery.     You can take Tylenol as needed for pain up until the day before surgery.    Continue your prescription medications until the night before surgery, unless otherwise discussed.     Hold aspirin and plavix 5 days prior to procedure      Medications he will take the morning of surgery will be: nexium, hydralazine, and metoprolol with small sip of water    For pain: tylenol 500 mg 3 times daily (stop ibuprofen due to CKD)      PLAN:  Medically stable for OR as planned.  Perioperative management per Prashant Castro MD.  Continue current medications preoperatively through day before surgery, with resumption thereafter. Hold antiinflammatory, herbal and over the counter medications 1 week preoperatively.  If otherwise well, return to clinic in 6 months; earlier PRN       pt sent by [facility] for to r/o epiglotitis.  pt c/o throat edema with sob with difficulty swallowing.  no drooling or stridor noted at this time.